# Patient Record
Sex: FEMALE | Race: WHITE | NOT HISPANIC OR LATINO | Employment: OTHER | URBAN - METROPOLITAN AREA
[De-identification: names, ages, dates, MRNs, and addresses within clinical notes are randomized per-mention and may not be internally consistent; named-entity substitution may affect disease eponyms.]

---

## 2019-05-20 ENCOUNTER — TRANSFERRED RECORDS (OUTPATIENT)
Dept: HEALTH INFORMATION MANAGEMENT | Facility: CLINIC | Age: 69
End: 2019-05-20

## 2019-05-24 ENCOUNTER — HOSPITAL ENCOUNTER (INPATIENT)
Facility: CLINIC | Age: 69
End: 2019-05-24
Payer: COMMERCIAL

## 2019-05-30 ENCOUNTER — OFFICE VISIT - HEALTHEAST (OUTPATIENT)
Dept: GERIATRICS | Facility: CLINIC | Age: 69
End: 2019-05-30

## 2019-05-30 DIAGNOSIS — K21.9 GASTROESOPHAGEAL REFLUX DISEASE WITHOUT ESOPHAGITIS: ICD-10-CM

## 2019-05-30 DIAGNOSIS — I48.91 ATRIAL FIBRILLATION WITH RAPID VENTRICULAR RESPONSE (H): ICD-10-CM

## 2019-05-30 DIAGNOSIS — I63.9 CEREBROVASCULAR ACCIDENT (CVA), UNSPECIFIED MECHANISM (H): ICD-10-CM

## 2019-05-30 DIAGNOSIS — G40.209 PARTIAL SYMPTOMATIC EPILEPSY WITH COMPLEX PARTIAL SEIZURES, NOT INTRACTABLE, WITHOUT STATUS EPILEPTICUS (H): ICD-10-CM

## 2019-05-30 DIAGNOSIS — I10 ACCELERATED HYPERTENSION: ICD-10-CM

## 2019-05-30 DIAGNOSIS — F43.21 ADJUSTMENT DISORDER WITH DEPRESSED MOOD: ICD-10-CM

## 2019-05-31 ENCOUNTER — OFFICE VISIT - HEALTHEAST (OUTPATIENT)
Dept: GERIATRICS | Facility: CLINIC | Age: 69
End: 2019-05-31

## 2019-05-31 DIAGNOSIS — K21.9 GASTROESOPHAGEAL REFLUX DISEASE WITHOUT ESOPHAGITIS: ICD-10-CM

## 2019-05-31 DIAGNOSIS — L29.9 PRURITUS: ICD-10-CM

## 2019-05-31 DIAGNOSIS — I10 ACCELERATED HYPERTENSION: ICD-10-CM

## 2019-05-31 DIAGNOSIS — G40.209 PARTIAL SYMPTOMATIC EPILEPSY WITH COMPLEX PARTIAL SEIZURES, NOT INTRACTABLE, WITHOUT STATUS EPILEPTICUS (H): ICD-10-CM

## 2019-05-31 DIAGNOSIS — I10 BENIGN ESSENTIAL HYPERTENSION: ICD-10-CM

## 2019-05-31 DIAGNOSIS — E78.5 DYSLIPIDEMIA: ICD-10-CM

## 2019-05-31 DIAGNOSIS — I48.91 ATRIAL FIBRILLATION WITH RAPID VENTRICULAR RESPONSE (H): ICD-10-CM

## 2019-05-31 DIAGNOSIS — R42 VERTIGO: ICD-10-CM

## 2019-05-31 DIAGNOSIS — I63.9 CEREBROVASCULAR ACCIDENT (CVA), UNSPECIFIED MECHANISM (H): ICD-10-CM

## 2019-06-03 ENCOUNTER — COMMUNICATION - HEALTHEAST (OUTPATIENT)
Dept: GERIATRICS | Facility: CLINIC | Age: 69
End: 2019-06-03

## 2019-06-03 ENCOUNTER — RECORDS - HEALTHEAST (OUTPATIENT)
Dept: LAB | Facility: CLINIC | Age: 69
End: 2019-06-03

## 2019-06-03 LAB
ANION GAP SERPL CALCULATED.3IONS-SCNC: 9 MMOL/L (ref 5–18)
BASOPHILS # BLD AUTO: 0 THOU/UL (ref 0–0.2)
BASOPHILS NFR BLD AUTO: 0 % (ref 0–2)
BUN SERPL-MCNC: 16 MG/DL (ref 8–22)
CALCIUM SERPL-MCNC: 10 MG/DL (ref 8.5–10.5)
CHLORIDE BLD-SCNC: 111 MMOL/L (ref 98–107)
CO2 SERPL-SCNC: 23 MMOL/L (ref 22–31)
CREAT SERPL-MCNC: 0.68 MG/DL (ref 0.6–1.1)
EOSINOPHIL # BLD AUTO: 0.4 THOU/UL (ref 0–0.4)
EOSINOPHIL NFR BLD AUTO: 5 % (ref 0–6)
ERYTHROCYTE [DISTWIDTH] IN BLOOD BY AUTOMATED COUNT: 11.9 % (ref 11–14.5)
GFR SERPL CREATININE-BSD FRML MDRD: >60 ML/MIN/1.73M2
GLUCOSE BLD-MCNC: 94 MG/DL (ref 70–125)
HCT VFR BLD AUTO: 32.7 % (ref 35–47)
HGB BLD-MCNC: 10.8 G/DL (ref 12–16)
LEVETIRACETAM (KEPPRA): 25.5 UG/ML (ref 6–46)
LYMPHOCYTES # BLD AUTO: 2 THOU/UL (ref 0.8–4.4)
LYMPHOCYTES NFR BLD AUTO: 20 % (ref 20–40)
MCH RBC QN AUTO: 31.2 PG (ref 27–34)
MCHC RBC AUTO-ENTMCNC: 33 G/DL (ref 32–36)
MCV RBC AUTO: 95 FL (ref 80–100)
MONOCYTES # BLD AUTO: 0.8 THOU/UL (ref 0–0.9)
MONOCYTES NFR BLD AUTO: 8 % (ref 2–10)
NEUTROPHILS # BLD AUTO: 6.4 THOU/UL (ref 2–7.7)
NEUTROPHILS NFR BLD AUTO: 67 % (ref 50–70)
PLATELET # BLD AUTO: 455 THOU/UL (ref 140–440)
PMV BLD AUTO: 10.6 FL (ref 8.5–12.5)
POTASSIUM BLD-SCNC: 3.6 MMOL/L (ref 3.5–5)
RBC # BLD AUTO: 3.46 MILL/UL (ref 3.8–5.4)
SODIUM SERPL-SCNC: 143 MMOL/L (ref 136–145)
WBC: 9.7 THOU/UL (ref 4–11)

## 2019-06-04 ENCOUNTER — COMMUNICATION - HEALTHEAST (OUTPATIENT)
Dept: ADMINISTRATIVE | Facility: CLINIC | Age: 69
End: 2019-06-04

## 2019-06-04 ENCOUNTER — OFFICE VISIT - HEALTHEAST (OUTPATIENT)
Dept: GERIATRICS | Facility: CLINIC | Age: 69
End: 2019-06-04

## 2019-06-04 DIAGNOSIS — I63.9 CEREBROVASCULAR ACCIDENT (CVA), UNSPECIFIED MECHANISM (H): ICD-10-CM

## 2019-06-04 DIAGNOSIS — E78.5 DYSLIPIDEMIA: ICD-10-CM

## 2019-06-04 DIAGNOSIS — I10 BENIGN ESSENTIAL HYPERTENSION: ICD-10-CM

## 2019-06-04 DIAGNOSIS — F43.21 ADJUSTMENT DISORDER WITH DEPRESSED MOOD: ICD-10-CM

## 2019-06-04 DIAGNOSIS — K21.9 GASTROESOPHAGEAL REFLUX DISEASE WITHOUT ESOPHAGITIS: ICD-10-CM

## 2019-06-04 DIAGNOSIS — I10 ACCELERATED HYPERTENSION: ICD-10-CM

## 2019-06-04 DIAGNOSIS — R42 VERTIGO: ICD-10-CM

## 2019-06-04 DIAGNOSIS — G40.209 PARTIAL SYMPTOMATIC EPILEPSY WITH COMPLEX PARTIAL SEIZURES, NOT INTRACTABLE, WITHOUT STATUS EPILEPTICUS (H): ICD-10-CM

## 2019-06-06 ENCOUNTER — AMBULATORY - HEALTHEAST (OUTPATIENT)
Dept: GERIATRICS | Facility: CLINIC | Age: 69
End: 2019-06-06

## 2019-06-08 PROBLEM — E83.19 MULTIPLE HEMOSIDERIN DEPOSITS IN BRAIN: Status: ACTIVE | Noted: 2019-06-08

## 2019-06-08 PROBLEM — G93.89 MULTIPLE HEMOSIDERIN DEPOSITS IN BRAIN: Status: ACTIVE | Noted: 2019-06-08

## 2019-06-08 PROBLEM — Z86.73 HISTORY OF CVA (CEREBROVASCULAR ACCIDENT): Status: ACTIVE | Noted: 2019-06-08

## 2019-06-08 PROBLEM — G40.209 NONINTRACTABLE EPILEPSY WITH COMPLEX PARTIAL SEIZURES (H): Status: ACTIVE | Noted: 2019-06-08

## 2019-06-08 PROBLEM — I48.0 PAROXYSMAL ATRIAL FIBRILLATION (H): Status: ACTIVE | Noted: 2019-06-08

## 2019-06-08 PROBLEM — I10 BENIGN ESSENTIAL HYPERTENSION: Status: ACTIVE | Noted: 2019-06-08

## 2019-06-08 PROBLEM — E78.5 HYPERLIPIDEMIA: Status: ACTIVE | Noted: 2019-06-08

## 2019-06-08 PROBLEM — K21.9 GASTROESOPHAGEAL REFLUX DISEASE WITHOUT ESOPHAGITIS: Status: ACTIVE | Noted: 2019-06-08

## 2019-06-10 ENCOUNTER — TELEPHONE (OUTPATIENT)
Dept: FAMILY MEDICINE | Facility: CLINIC | Age: 69
End: 2019-06-10

## 2019-06-10 NOTE — TELEPHONE ENCOUNTER
[facility] phone call message- general phone call:    Reason for call: Requesting verbal orders for SN for 2 x a wk for 1 wk, 1 x a wk for 3 wks and 1 x for discharge. This is for med and bp management/education.    New diagnosis for this patient is A fib and they prescribed Eliquis.  This is not covered and it's $400 a month.  Can you prescribe something else?     What do you want reported to you? What are your BP parameters?  Return call needed: Yes    OK to leave a message on voice mail? Yes    Primary language: English      needed? No    Call taken on Mya 10, 2019 at 11:21 AM by Fransisco Mukherjee

## 2019-06-10 NOTE — TELEPHONE ENCOUNTER
Called and gave V.O. For SN for 2x a week for 1 week, then 1x a week for 3 weeks and 1 x for discharge.    Also let HHN know that we have not seen patient in clinic but will pass on information re: a fib and the eliquis along with parameters and what provider wants reported.    Routed to Dr. Bryant/DEMETRICE Washburn RN

## 2019-06-11 ENCOUNTER — OFFICE VISIT (OUTPATIENT)
Dept: FAMILY MEDICINE | Facility: CLINIC | Age: 69
End: 2019-06-11
Payer: COMMERCIAL

## 2019-06-11 ENCOUNTER — OFFICE VISIT (OUTPATIENT)
Dept: PHARMACY | Facility: CLINIC | Age: 69
End: 2019-06-11
Payer: COMMERCIAL

## 2019-06-11 VITALS
OXYGEN SATURATION: 98 % | SYSTOLIC BLOOD PRESSURE: 171 MMHG | HEART RATE: 60 BPM | RESPIRATION RATE: 16 BRPM | TEMPERATURE: 98.2 F | DIASTOLIC BLOOD PRESSURE: 72 MMHG

## 2019-06-11 DIAGNOSIS — I48.0 PAROXYSMAL ATRIAL FIBRILLATION (H): ICD-10-CM

## 2019-06-11 DIAGNOSIS — Z00.00 HEALTH CARE MAINTENANCE: ICD-10-CM

## 2019-06-11 DIAGNOSIS — I10 BENIGN ESSENTIAL HYPERTENSION: Primary | ICD-10-CM

## 2019-06-11 DIAGNOSIS — G40.209 PARTIAL SYMPTOMATIC EPILEPSY WITH COMPLEX PARTIAL SEIZURES, NOT INTRACTABLE, WITHOUT STATUS EPILEPTICUS (H): ICD-10-CM

## 2019-06-11 DIAGNOSIS — Z86.73 HISTORY OF CVA (CEREBROVASCULAR ACCIDENT): ICD-10-CM

## 2019-06-11 DIAGNOSIS — E78.5 HYPERLIPIDEMIA, UNSPECIFIED HYPERLIPIDEMIA TYPE: ICD-10-CM

## 2019-06-11 DIAGNOSIS — I48.0 PAROXYSMAL ATRIAL FIBRILLATION (H): Primary | ICD-10-CM

## 2019-06-11 RX ORDER — LISINOPRIL 40 MG/1
40 TABLET ORAL DAILY
COMMUNITY
Start: 2019-06-11 | End: 2019-06-27

## 2019-06-11 RX ORDER — CALCIUM CARBONATE 500 MG/1
1 TABLET, CHEWABLE ORAL 2 TIMES DAILY PRN
COMMUNITY
Start: 2019-06-11 | End: 2022-08-22

## 2019-06-11 RX ORDER — ATORVASTATIN CALCIUM 40 MG/1
40 TABLET, FILM COATED ORAL DAILY
COMMUNITY
Start: 2019-06-11 | End: 2019-06-27

## 2019-06-11 RX ORDER — LEVETIRACETAM 750 MG/1
750 TABLET ORAL 2 TIMES DAILY
COMMUNITY
Start: 2019-06-11 | End: 2019-06-27

## 2019-06-11 RX ORDER — FAMOTIDINE 20 MG/1
20 TABLET, FILM COATED ORAL 2 TIMES DAILY
COMMUNITY
Start: 2019-06-11 | End: 2019-06-27

## 2019-06-11 RX ORDER — METOPROLOL SUCCINATE 200 MG/1
200 TABLET, EXTENDED RELEASE ORAL DAILY
COMMUNITY
Start: 2019-06-11 | End: 2019-06-27

## 2019-06-11 RX ORDER — ACETAMINOPHEN 325 MG/1
325-650 TABLET ORAL EVERY 6 HOURS PRN
COMMUNITY
Start: 2019-06-11 | End: 2021-08-16

## 2019-06-11 NOTE — PROGRESS NOTES
Preceptor Attestation:   Patient seen, evaluated and discussed with the resident. I have verified the content of the note, which accurately reflects my assessment of the patient and the plan of care.   Supervising Physician:  Angel Cagle MD

## 2019-06-11 NOTE — PATIENT INSTRUCTIONS
Thanks for coming in today Alan! It was good to see you out of the hospital.      a blood pressure cuff and take your blood pressure once a day (different times of the day) for the next 2 weeks. Keep a log and brink this with to clinic. Then we can decide if we need to tweak your BP medications.    I sent in a referral for Optometry to have your eyes checked.    We changed your anticoagulant from Eliquis to Xarelto. This is now a once a day medication.     I look forward to seeing you again on 6/25. Remember to bring your BP log with you.    We'll be prepared in 2 weeks in case we are still running into issues with insurance.      OPHTHALMOLOGY ADULT REFERRAL    [facility]    [address] [place] [address]      Appointment:  Tuesday July 16, 2019  Arrival Time:  1:35 pm  Provider:  Dr. Rancho Nguyen     Please bring a copy of your insurance card and photo ID    Your appointment will be between 90 minutes to 2 hours long    Your eyes will be dilated     If you wear contacts or glasses please bring these with you to your appointment     If you cannot make this appointment, please contact [phone] to reschedule

## 2019-06-11 NOTE — PROGRESS NOTES
Hospitalization Follow-up Visit         \[facility]\""       Hospital Follow-up Visit:    Hospital:  [facility]   Date of Admission: 5/20/99  Date of Discharge: 5/29/19  Reason(s) for Admission: Stroke, atrial fibrillation with RVR, hypertensive urgency            Problems taking medications regularly:  None       Post Discharge Medication Reconciliation: discharge medications reconciled and changed, per note/orders (see AVS). Eliquis not covered, changed to Xarelto.       Problems adhering to non-medication therapy:  None       Medications reviewed by: by PharmD    Summary of hospitalization:  [facility] discharge summary reviewed  Diagnostic Tests/Treatments reviewed.  Follow up needed: none  Other Healthcare Providers Involved in Patient s Care:         Homecare and Specialist appointment - Neurology and ENT  Update since discharge: improved. Has been asymptomatic and stable. Doing well with home OT, PT and nursing care. No recurrence of dizziness  Plan of care communicated with patient and her daughter                   Review of Systems:   CONSTITUTIONAL: no fatigue, no unexpected change in weight  SKIN: no worrisome rashes, no worrisome moles, no worrisome lesions  EYES: no acute vision problems or changes  ENT: no ear problems, no mouth problems, no throat problems  RESP: no significant cough, no shortness of breath  CV: no chest pain, no palpitations, no new or worsening peripheral edema  GI: no nausea, no vomiting, no constipation, no diarrhea  EXT: Itchy feet            Physical Exam:     Vitals:    06/11/19 1352 06/11/19 1355 06/11/19 1451   BP: 174/81 179/78 171/72   BP Location:   Left arm   Patient Position:   Sitting   Cuff Size:   Adult Large   Pulse: 60     Resp: 16     Temp: 98.2  F (36.8  C)     TempSrc: Oral     SpO2: 98%       There is no height or weight on file to calculate BMI.    GENERAL: healthy, alert and no distress  FACE: Well healing echymosis and nasal bridge laceration  EYES: Eyes  "grossly normal to inspection, extraocular movements - intact, and PERRL  NECK: no tenderness, no adenopathy, no asymmetry, no masses, no stiffness; thyroid- normal to palpation  RESP: lungs clear to auscultation - no rales, no rhonchi, no wheezes  CV: regular rates and rhythm, normal S1 S2, no S3 or S4 and no murmur, no click or rub -  ABDOMEN: soft, no tenderness, no  hepatosplenomegaly, no masses, normal bowel sounds  MS: extremities- no gross deformities noted, no edema  SKIN: no suspicious lesions, no rashes  NEURO: strength and tone- normal, sensory exam- grossly normal, mentation- intact, speech- normal, reflexes- symmetric  BACK: no CVA tenderness, no paralumbar tenderness  PSYCH: Alert and oriented times 3; speech- coherent , normal rate and volume; able to articulate logical thoughts, able to abstract reason, no tangential thoughts, no hallucinations or delusions, affect- normal  LYMPHATICS: ant. cervical- normal, post. cervical- normal         Results:   Results from last visit No results found for this or any previous visit.    Assessment and Plan      Galina was seen today for recheck.    Diagnoses and all orders for this visit:    Benign essential hypertension  Patient was 200s/100s systolic on presentation to hospital, eventually able to get down to the 140s-150s systolic with lisinopril 40 mg and metroprolol succ 200 mg daily, she remained in this range during her TCU stay. Today 170s/70-80s even on repeat and apparently this high when checked by home nurse. She attributes this to \"white coat hypertension\", prior to hospital stay had not been seen by a physician for 20+ years. She is asymptomatic. Prior to changing adding another BP med, I would like to see non-medical personnel related BPs especially when BPs had been better controlled on this regimen. DME rx sent for home BP cuff, she will keep a log and bring to her appt with me in 2 weeks. I think the next step if she continues to be high would be " lisinopril-hydrochlorothiazide combination medication to reduce pill burden (prior to hospitalization, had not been on any medications).   -     order for DME; Equipment being ordered: Digital home blood pressure monitor kit  -     Continue lisinopril 40 mg daily  -     Continue metoprolol succinate 200 mg daily    Health care maintenance  Thinks her vision is getting worse and may need glasses  -     OPTOMETRY REFERRAL    Partial symptomatic epilepsy with complex partial seizures, not intractable, without status epilepticus (H)  No more dizziness or absence-like activity. She has a follow up scheduled with neurology in         -     Continue Keppra 750 mg daily    Hyperlipidemia, unspecified hyperlipidemia type        -      Continue Atorvastatin 40 mg    Paroxysmal atrial fibrillation (H)  Patient presented to hospital in A fib, converted with dilt and rates well controlled with metoprolol. She is in a regular rhythm today. She was rx'd eliquis BID in the hospital, but on discharge from TCU found out this was not covered by her insurance and would cost ~$400/month. She only has one day left of the eliquis. Clinic pharmacist met with patient as well to discuss options and possibility of patient qualifying for medicaid, which would back pay medication costs up to 3 months. Clinic social work also met with patient to discuss medicaid. Xarelto was determined to be close to ~$200/month with added benefit of once daily dosing. Warfarin maintenance was more than patient is able to do, plus would have to bridge with lovenox, which is expensive. Patient and daughter decided they will pay for one month's worth of Xarelto while they apply for medicaid. We can follow up on this at our appointment in 2 weeks.          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 No new focal neurological findings. As above, neuro follow up schedule. Has home nursing and home PT/OT.        -      Continue Atorvastatin 40 mg        -       Anticoagulation, as above      E&M code to be billed if TCM cannot be: 01426    Type of decision making: Moderate complexity (44247)      Options for treatment and follow-up care were reviewed with the patient  Galina Barnes   engaged in the decision making process and verbalized understanding of the options discussed and agreed with the final plan.    Patient discussed with attending physician Dr. Cagle who agrees with the plan.     Jose Bryant MD

## 2019-06-11 NOTE — PROGRESS NOTES
KLAUDIA met with pt and daughter per Dr. Bryant's request. Pt expresses concern due to recent stroke. Currently taking Xarelto, however she is unable to afford monthly prescriptions (~$400). Pt and daughter are interested in her receiving coverage under [place] [employer]. Pt told billable costs may be covered three months before receiving coverage. Pt appears to qualify for coverage with an income of ~$1,000/mo. Pt had received previous input to contact [employer] regarding prescription discounts based on income.    Pt and daughter received resources regarding application to MA. KLAUDIA provided education regarding application process, seeking assistance via phone, and consistently contacting [facility] to expedite the process.    Pt is interested in passing power of  over to her daughter. No resources were distributed regarding POA at this time. Pt and daughter instructed to contact KLAUDIA if any other concerns arise.    LEAH Vasquez  6/11/2019

## 2019-06-12 ENCOUNTER — TELEPHONE (OUTPATIENT)
Dept: FAMILY MEDICINE | Facility: CLINIC | Age: 69
End: 2019-06-12

## 2019-06-12 ENCOUNTER — OFFICE VISIT (OUTPATIENT)
Dept: FAMILY MEDICINE | Facility: CLINIC | Age: 69
End: 2019-06-12
Payer: COMMERCIAL

## 2019-06-12 VITALS
RESPIRATION RATE: 20 BRPM | TEMPERATURE: 98 F | DIASTOLIC BLOOD PRESSURE: 78 MMHG | SYSTOLIC BLOOD PRESSURE: 181 MMHG | OXYGEN SATURATION: 97 % | HEART RATE: 54 BPM

## 2019-06-12 DIAGNOSIS — G44.209 TENSION HEADACHE: ICD-10-CM

## 2019-06-12 DIAGNOSIS — I10 BENIGN ESSENTIAL HYPERTENSION: Primary | ICD-10-CM

## 2019-06-12 RX ORDER — AMLODIPINE BESYLATE 10 MG/1
10 TABLET ORAL DAILY
Qty: 90 TABLET | Refills: 1 | Status: SHIPPED | OUTPATIENT
Start: 2019-06-12 | End: 2019-12-01

## 2019-06-12 NOTE — TELEPHONE ENCOUNTER
Pt is seeing Dr. Alexander this afternoon. Dr. Bryant said the provider seeing pt today can call him.    Routed to Dr. Alexander. /DONALDO Mackay

## 2019-06-12 NOTE — PROGRESS NOTES
Preceptor attestation:  Vital signs reviewed: /78   Pulse 54   Temp 98  F (36.7  C) (Oral)   Resp 20   SpO2 97%     Patient seen, evaluated, and discussed with the resident.  I have verified the content of the note, which accurately reflects my assessment of the patient and the plan of care.    Supervising physician: Sandhya Newman MD  [facility]

## 2019-06-12 NOTE — PROGRESS NOTES
Transitional Care / Medication Management Note                                                       Galina was referred by Dr. Bryant for pharmacy services for TCM/new patient    MEDICATION REVIEW:  Discussed all medication indications, dosage and effectiveness, adverse effects, and adherence with patient/caregiver.    Pt had meds with them: yes  Pt had med list with them: yes  Pt was knowledgeable about meds: yes  Medications set up by: patient  Medications administered by someone else (e.g., LTCF): No  Pt uses a medication box or automated dispenser: no  Patient has been seen by PharmD in past 6 months:  no    Medication Discrepancies  Medications on EMR med list that pt is NOT taking:  none  Medications (including OTCs/supplements) pt IS taking that are NOT on EMR med list (e.g., from specialist, hospital): yes, Atorvastatin, Famotidine, Lisinopril, Metoprolol XL, Levetiracetam, APAP, Tums, Eliquis, hydrocortisone cream  Dosage or frequency listed differently than how patient is taking: none  Duplicate medication on list (two occurrences of the same medication):  none  TOTAL NUMBER OF MEDICATION DISCREPANCIES: 9     The following medications were added in the hospital:    Atorvastatin, Famotidine, Lisinopril, Metoprolol XL, Levetiracetam, APAP, Tums, Eliquis, hydrocortisone cream  The following medications were discontinued in the hospital:    none  The following medications had dose/frequency changes in the hospital:    none  The following medication changes made in the hospital were not implemented by the patient:    none    Subjective                                                       Patient reports the following problems or concerns with their medications:  yes, Eliquis (see below)  Patient reports the following adverse reactions to medications:  yes, feet itch since starting medications  Pt reports missing doses:  never  Additional subjective information (e.g., reason for visit, frequency of PRNs, reasons  meds were D/C ed):    Prior to hospital admission on 5/20/19, patient had not seen a medical provider in 27 years    Admitted for hypertensive urgency 5/20/19-5/29/19    Several diagnoses made during hospital admission    Patients daughter, who works full time, very involved with her care and at visit today    Daughter made patient a chart of patients medications, noting which medications are taken in the morning and evening    Patient finds the chart very helpful to know what medications she takes at different times of day as the complex medication regimen is new to her    Patient reported concerns     Eliquis co-pay ~$400 and not affordable    Feet have been itching/hurting since hospital admission and starting medications, asked if itching could be caused by any of medications she is on. Itching got worse after hospital discharge, during her stay at SNF, but has gotten better since she has been home.    Reports using APAP 650 mg TID for feet, which helps. She currently using APAP 325 mg tabs at home, but new bottle is APAP 500 mg tabs.    Reports currently using Hydrocortisone Cream (strength unknown) 1-2 x per day, everyday, which helps. At SNF used cream 3-4 x per day.    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     Gastroesophageal reflux disease without esophagitis     Hyperlipidemia       Current Outpatient Medications   Medication Sig Dispense Refill     acetaminophen (TYLENOL) 325 MG tablet Take 1-2 tablets (325-650 mg) by mouth every 6 hours as needed for mild pain       atorvastatin (LIPITOR) 40 MG tablet Take 1 tablet (40 mg) by mouth daily       calcium carbonate  (TUMS) 500 MG chewable tablet Take 1 tablet (500 mg) by mouth 2 times daily as needed for heartburn       famotidine (PEPCID) 20 MG tablet Take 1 tablet (20 mg) by mouth 2 times daily       levETIRAcetam (KEPPRA) 750 MG tablet Take 1 tablet (750 mg) by mouth 2 times daily       lisinopril (PRINIVIL/ZESTRIL) 40 MG tablet Take 1 tablet (40 mg) by mouth daily       metoprolol succinate ER (TOPROL-XL) 200 MG 24 hr tablet Take 1 tablet (200 mg) by mouth daily       rivaroxaban ANTICOAGULANT (XARELTO) 20 MG TABS tablet Take 1 tablet (20 mg) by mouth daily (with dinner) 90 tablet 3     order for DME Equipment being ordered: Digital home blood pressure monitor kit 1 each 0       Social History     Tobacco Use     Smoking status: Never Smoker     Smokeless tobacco: Never Used   Substance Use Topics     Alcohol use: Not Currently     Drug use: Never       CrCl cannot be calculated (No order found.).    No results found for: A1C  Last Comprehensive Metabolic Panel:  No results found for: NA, POTASSIUM, CHLORIDE, CO2, ANIONGAP, GLC, BUN, CR, GFRESTIMATED, RIGOBERTO    BP Readings from Last 3 Encounters:   06/11/19 171/72       The ASCVD Risk score (Zanoni ALEJA Jr., et al., 2013) failed to calculate for the following reasons:    The patient has a prior MI or stroke diagnosis    PHQ-9 score:  No flowsheet data found.    Assessment / Plan                                                       Updated medication list in the EMR; deleted meds patient no longer taking and added meds patient is now taking, and changed doses where there was a dose discrepancy.    All medications were reviewed and found to be indicated, effective, safe and convenient/ affordable unless drug therapy problem(s) was/were identified, as are described below.      A fib - uncontrolled     Eliquis unaffordable    Anticoagulation prescribed for A fib/CVA. Patient following up with neurology and cardiology as recommended.      Patient and patients daughter not interested  in Warfarin due to amount of monitoring involved and daughter not able to bring her in on a frequent basis due to working full time    Eliquis switched to Xarelto today    After checking with patients pharmacy, Xarelto co-pay ~$200/month.  Rahul involved with helping patient figure out insurance plan options so she has affordable co-pays. Rahul believes she will qualify for Medicaid and they will then back-pay for the cost of the medications.    Hypertension - uncontrolled     Today 168/80    Given patient previously controlled on medications (Lisinopril 40 mg daily and Metoprolol  mg daily) and given recent circumstances, patient will monitor blood pressure at home per Dr Bryant (home monitor prescribed today) and reassess at next visit    Feet itching/pain - uncontrolled     Using Hydrocortisone cream 1-2 times per day, everyday, APAP 650mg TID    Given condition improving, will reassess use at next visit    Informed patient this is unlikely to be due to medication side effects    Options for treatment and/or follow-up care were reviewed with the patient.  Galina was engaged and actively involved in the decision making process, verbalized understanding of the options discussed, and was satisfied with the final plan.    Patient was provided with written instructions/medication list via AVS.     Follow-up                                                       Medication issues be addressed at a future visit      Xarelto- co-pay/insurance; approved for [place]?    Assess home blood pressures    Dr. Bryant was provided the recommendations above  via routed note and Dr. Hicks was available for supervision during this visit and is the authorizing prescriber for this visit through the pharmacist collaborative practice agreement.    Vnaessa Odell, PharmD Student    Drug therapy problems identified  1. Med: Eliquis - Compliance - Patient cannot afford - Resolution: Change drug; Patient agreed-CPA    # of medical  conditions addressed: 2  # of medications addressed: 9  # of medication discrepancies identified: 9  # of DTP identified: 1  Time spent: 30 minutes  Level of service: 2nc    I was present with the pharmacy student who participated in the service and in the documentation of this note. I have verified the history, personally performed the medical decision making, and have verified the content of the note, which accurately reflects my assessment of the patient and the plan of care.   Shania Fournier, PharmD

## 2019-06-12 NOTE — TELEPHONE ENCOUNTER
[facility] phone call message- general phone call:    Reason for call: Requesting a verbal order for a SW eval.  Pt's BP was 180/110 today. Pt is taking medications as prescribed.    Return call needed: Yes    OK to leave a message on voice mail? Yes    Primary language: English      needed? No    Call taken on June 12, 2019 at 11:21 AM by Fransisco Mukherjee

## 2019-06-12 NOTE — PROGRESS NOTES
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     Patient's home health nurse called earlier today concerned about elevated blood pressure to 180/110, on recheck 179/110. Patient denied any acute symptoms including dizziness, weakness, shortness of breath, chest pain, nausea, vomiting or urinary symptoms but did complain of chronic headaches. She reports that this feels like her usual headaches that are related to stress. Patient was advised to come to clinic today for evaluation. Of note, patient was seen in clinic yesterday for HTN. She was prescribed a home BP monitor kit, and encouraged to continue Lisinopril 40mg and Metoprolol succinate 200mg. In clinic, she continues to deny any significant symptoms. Her headache improved with some Tylenol earlier. She's been working eating less sodium in her diet and wondering what else she should do. Reports adherence to all meds since hospital discharge.     Objective:  Vitals:    06/12/19 1534   BP: 181/78   Pulse: 54   Resp: 20   Temp: 98  F (36.7  C)   TempSrc: Oral   SpO2: 97%     GEN: NAD, pleasant, alert  EYES: healing bruises around eyes and cheekbones without gross deformity, EOMI, normal conjunctivae/sclerae  RESP: CTAB, no w/r/r  CV: slow rate, regular rhythm, nl S1/S2, no m/r/g, no peripheral edema  MSK: steady gait with walking cane  NEURO: normal/equal strength and tone of upper and lower extremities, sensory exam grossly normal, mentation intact, speech normal  PSYCH: mentation appears normal, affect  normal/bright    Assessment/Plan:  Galina was seen today for hypertension.    Diagnoses and all orders for this visit:    Benign essential hypertension, above goal  Patient remains largely asymptomatic from elevated BP. Her chronic headaches appear unaffected by her BP and resolve with Tylenol PRN. There was concern for an element of whitecoat/office-based HTN, but given her elevated BP readings at home we discussed adding another antihypertensive. She's agreeable to start Amlodipine today, discussed common side effects. Encouraged lower sodium diet, exercise, log home BP, and bring logbook to clinic in 2 weeks for BP recheck.   -     amLODIPine (NORVASC) 10 MG tablet; Take 1 tablet (10 mg) by mouth daily    Options for treatment and follow-up care were reviewed with the patient who was engaged and actively involved in the decision making process, verbalized understanding of the options discussed, and satisfied with the final plan.    Patient was staffed with supervising physician, Dr. Newman.     Naun Alexander MD, PGY-2  [facility]

## 2019-06-12 NOTE — TELEPHONE ENCOUNTER
180/110, 179/110 (second check)  HR 60s. Pt was asymptomatic. Pt is taking lisinopril and metoprolol as directed.    HHN is going back Friday.     Discussed the above with Dr. Bryant, patient should be seen in clinic if she is unable to check her bp at home herself.     I talked to the pt, pt states she feels fine, denies dizziness, HA, weakness, SOB, chest pain, n/v. Advised to be seen in clinic today per Dr. Bryant's recommendation. Pt states she needs to call her daughter to give her a ride and she will call back to schedule an appt in clinic.     Routed to Dr. Bryant.    /DONALDO Mackay

## 2019-06-12 NOTE — PATIENT INSTRUCTIONS
- Start Amlodipine, 1 tab daily  - Return in 2 weeks for blood pressure recheck or sooner if concerns  - Work on low sodium diet, exercise  - Follow up with Neurology  - Follow up with ENT  - Follow up with Cardiology

## 2019-06-13 ENCOUNTER — TELEPHONE (OUTPATIENT)
Dept: FAMILY MEDICINE | Facility: CLINIC | Age: 69
End: 2019-06-13

## 2019-06-13 NOTE — TELEPHONE ENCOUNTER
[facility] phone call message- general phone call:    Reason for call:     GOYO - OCCUPATIONAL THERAPIST  [phone]  [facility]    Requesting verbal orders for:    Therapeutic activities   ADLs  IADLs  Cognitive assessments  Fall precautions    1x a week for 4 weeks,     Secure voicemail. Please leave message if needed.     Return call needed: Yes    OK to leave a message on voice mail? Yes    Primary language: English      needed? No    Call taken on June 13, 2019 at 8:59 AM by Skylar Torres

## 2019-06-17 ENCOUNTER — TELEPHONE (OUTPATIENT)
Dept: FAMILY MEDICINE | Facility: CLINIC | Age: 69
End: 2019-06-17

## 2019-06-17 NOTE — TELEPHONE ENCOUNTER
[facility] phone call message- general phone call:    Reason for call: She is in the home with the patient and would like to talk to a nurse.    Return call needed: Yes    OK to leave a message on voice mail? Yes    Primary language: English      needed? No    Call taken on June 17, 2019 at 11:40 AM by Paul Tabares

## 2019-06-17 NOTE — TELEPHONE ENCOUNTER
Spoke with HHN who wanted to let provider know that patient's BP is 152/80.  HHN states that this is better than prior readings.   She is also going to be ordering the patient a wrist BP cuff so that she can continue to check her BP.    Gave V.O. For 1 more home visit this Thursday for BP recheck and medication education.    Routed to Dr. Bryant/DEMETRICE Washburn RN

## 2019-06-18 ENCOUNTER — TELEPHONE (OUTPATIENT)
Dept: PHARMACY | Facility: CLINIC | Age: 69
End: 2019-06-18

## 2019-06-18 NOTE — TELEPHONE ENCOUNTER
Patient's daughter dropped off a completed patient assistance program application for Xarelto.  I called her ([place]: [phone]).  She said her mom does not qualify for MA, so she is applying for as much help with Xarelto as possible.    As Dr. Bryant is not licensed, Dr. Porras signed the patient assistance form and I faxed it to [employer] today.    Shania Fournier, Pharm.D.

## 2019-06-24 ENCOUNTER — TELEPHONE (OUTPATIENT)
Dept: FAMILY MEDICINE | Facility: CLINIC | Age: 69
End: 2019-06-24

## 2019-06-24 NOTE — TELEPHONE ENCOUNTER
Gave V.O. For 2 nursing visits to begin on 7/8/19 with plans to discharge on 7/15/19 for bp monitoring and med education.    Noted VSS-  Routed to Dr. Bryant/DEMETRICE Washburn RN

## 2019-06-24 NOTE — TELEPHONE ENCOUNTER
[facility] phone call message- general phone call:    Reason for call: vital for today pulse 56 bp 140/82 tem 98.2 respiration 16. Orders two nursing visits beginning 7/8 with plans to discharge of 7/15 for bp monitoring and med edu.    Action desired:call back.        Return call needed: Yes    OK to leave a message on voice mail? Yes    Advised patient to response may take up to 2 business days: Yes    Primary language: English      needed? No    Call taken on June 24, 2019 at 10:28 AM by Paul Tabares

## 2019-06-25 ENCOUNTER — RECORDS - HEALTHEAST (OUTPATIENT)
Dept: ADMINISTRATIVE | Facility: OTHER | Age: 69
End: 2019-06-25

## 2019-06-25 ENCOUNTER — OFFICE VISIT (OUTPATIENT)
Dept: FAMILY MEDICINE | Facility: CLINIC | Age: 69
End: 2019-06-25
Payer: COMMERCIAL

## 2019-06-25 VITALS
DIASTOLIC BLOOD PRESSURE: 76 MMHG | WEIGHT: 139 LBS | OXYGEN SATURATION: 98 % | TEMPERATURE: 98.1 F | HEART RATE: 60 BPM | SYSTOLIC BLOOD PRESSURE: 137 MMHG | RESPIRATION RATE: 18 BRPM

## 2019-06-25 DIAGNOSIS — I10 BENIGN ESSENTIAL HYPERTENSION: ICD-10-CM

## 2019-06-25 DIAGNOSIS — Z00.00 ENCOUNTER FOR HEALTH MAINTENANCE EXAMINATION: Primary | ICD-10-CM

## 2019-06-25 DIAGNOSIS — M25.561 CHRONIC PAIN OF RIGHT KNEE: ICD-10-CM

## 2019-06-25 DIAGNOSIS — I48.0 PAROXYSMAL ATRIAL FIBRILLATION (H): ICD-10-CM

## 2019-06-25 DIAGNOSIS — G89.29 CHRONIC PAIN OF RIGHT KNEE: ICD-10-CM

## 2019-06-25 DIAGNOSIS — G40.209 PARTIAL SYMPTOMATIC EPILEPSY WITH COMPLEX PARTIAL SEIZURES, NOT INTRACTABLE, WITHOUT STATUS EPILEPTICUS (H): ICD-10-CM

## 2019-06-25 DIAGNOSIS — Z86.73 HISTORY OF CVA (CEREBROVASCULAR ACCIDENT): ICD-10-CM

## 2019-06-25 NOTE — PROGRESS NOTES
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  Same was seen last week for persistent hypertension in the 170-180 systolic range, pressures are taken by her home nurse.  At that time she was started on amlodipine 10 mg daily.  Since this visit she has received a home blood pressure monitor from home nursing and it is been keeping a log of her pressures.  Her log shows that she is largely in the 120- 140 range.  She has had no recurrence of dizziness, has had no recurrence of absence like seizures activity.  She feels like her mobility is improving, she is walking outside with the use of a cane, but in her small apartment she does not use a cane or a walker.  She has been working well with physical therapy, although she does not like her occupational therapist.  She continues to have occasional pain in her right knee from a dog walking injury, dog ran into her leg and \"bent her knee the wrong way\".  She often will wear a brace on her knee while she is walking, and with the brace on she does not experience knee pain.  She also has constant left ankle stiffness without pain, sometimes \"gives out\".  She is not sure she had an injury to this ankle in the past or not.    ROS:   Gen: No fevers, chills, weight loss  HEENT: No headache, vision changes, hearing loss, swallowing problems   CV: No chest pain, palpitations, peripheral edema  Resp: No SOB, cough, wheezing, congestion, coryza  GI: No " constipation, diarrhea, nausea, vomiting, heartburn, change in bowel habits  : No dysuria, hematuria, discharge  MSK: Knee pain, ankle stiffness  Skin: No rash, lesions    Objective:  Vitals:    06/25/19 1424   BP: 137/76   BP Location: Left arm   Patient Position: Sitting   Cuff Size: Adult Regular   Pulse: 60   Resp: 18   Temp: 98.1  F (36.7  C)   TempSrc: Oral   SpO2: 98%   Weight: 63 kg (139 lb)     There is no height or weight on file to calculate BMI.    GEN: NAD, healthy, alert  FACE: Well-healing, faint facial ecchymoses and central forehead contusion  EYES: grossly normal to inspection, PERRL, EOMI, normal conjunctivae/sclerae  HENT: normal ear canals/TM's, nose & mouth w/o ulcers or lesions, clear oropharynx, MMM  NECK: no LAD, asymmetry, masses, scars; thyroid normal to palpation  RESP: Normal work of breathing on room air, CTAB, no w/r/r  CV: Irregularly irregular rhythm, normal rate, no murmur appreciated, no peripheral edema, peripheral pulses strong  ABD: soft, NT/ND, no hepatosplenomegaly, no masses, no rebound, +BS throughout  MSK: Left knee normal. Right knee: No patellar or patellar tendon tenderness, tenderness immediately lateral to patella along joint line, no other joint line tenderness, full ROM.  Right ankle normal.  Left ankle: Significantly limited range of motion in all directions, but not painful.  Overall fullness of left ankle.  SKIN: no suspicious lesions or rashes  NEURO: normal strength and tone, sensory exam grossly normal, mentation intact, speech normal  PSYCH: mentation appears normal, affect normal/bright    No results found for this or any previous visit (from the past 24 hour(s)).    Assessment/Plan:  Galina was seen today for health maintenance and blood pressure check..    Diagnoses and all orders for this visit:    Encounter for health maintenance examination  Same had not been seen for ~20 years prior to admission at [facility] last month and thus is far behind  "on health maintenance screenings.  Most of the lab work including lipids, glucose, BMP, TSH, CBC was completed during her admission.  At this time she agrees to mammography, colonoscopy and DEXA scan as recommended by the [facility].  Declines lab work today for HIV/hepatitis C, agreed that this could be done at future visit.  -     MA SCREENING DIGITAL BILAT; Future  -     GASTROENTEROLOGY ADULT REF PROCEDURE ONLY  -     Dexa hip/pelvis/spine*; Future    Chronic pain of right knee  Chronic pain of right knee from what sounds like hyperextension injury years ago.  Exam consistent with possible osteoarthritis versus meniscal injury.  She and her daughter are requesting orthopedic referral, although [facility] does note that it really does not bother her that much.  -     ORTHOPEDICS ADULT REFERRAL; Future    Benign essential hypertension  Blood pressure under much better control today than the last 2 visits.  She also had good pressure control at home, I am happy that she is now received a home blood pressure monitor and is keeping a log.  She will continue to do this and bring logs to her visits.  She will continue her home metoprolol, lisinopril and amlodipine at this time.    History of CVA (cerebrovascular accident)  Partial symptomatic epilepsy with complex partial seizures, not intractable, without status epilepticus (H)  Stable and without other symptoms.  She has had no dizziness and seems to be recovering well from her CVA.  She has also had no recurrence of seizure activity.  Seizure activity is absence like in nature, only occurred once during inpatient stay, but convincing for seizure given elevated prolactin immediately following the episode.  Is been going well with PT, she is recovering well from a gait standpoint.  She does not like a recreational therapist, thinks that they are \"making her the best cripple she can be\".  -She has follow-up scheduled with neurology  -She has a follow-up scheduled with " otolaryngology    Paroxysmal atrial fibrillation (H)  Today by exam she is in atrial fibrillation, but she is asymptomatic and heart rate is well controlled in the 60s on metoprolol.  She is also currently anticoagulated on Xarelto.  She will follow-up with cardiology within the next 4-6 weeks as scheduled during her hospital stay.    Options for treatment and follow-up care were reviewed with the patient who was engaged and actively involved in the decision making process, verbalized understanding of the options discussed, and satisfied with the final plan.    Alan will follow-up with me again in 1-2 months after she follows up with the various specialists she is established with.  Of course, if something acute comes up meantime I am happy to see her sooner.    Patient was staffed with supervising physician, Dr. Fam Porras.     Jose Bryant MD, PGY-2  [facility]

## 2019-06-25 NOTE — PROGRESS NOTES
Preceptor Attestation:   Patient seen, evaluated and discussed with the resident. I have verified the content of the note, which accurately reflects my assessment of the patient and the plan of care.   Supervising Physician:  Fam Porras MD

## 2019-06-25 NOTE — PATIENT INSTRUCTIONS
Thanks for coming in again today, Alan. Your blood pressure is now under good control. Hooray!    I sent in the following referrals:  Colonoscopy  Mammogram  Dexa scan for osteoporosis  Orthopedics    Follow up Neurology and ENT as scheduled.    I will get the paperwork completed for your medications and we will work on other issues as they arise.    MAMMOGRAM  June 25, 2019 faxed order and demographics to [facility]iology Scheduling at [phone] who will contact patient to assist. Ramu Olguin CMA      DEXA SCAN  June 25, 2019 faxed order and demographics to [facility]iology Scheduling at [phone] who will contact patient to assist. Ramu Olguin CMA      COLONOSCOPY   June 25, 2019 COLONOSCOPY Screening was ordered.     GASTROENTEROLOGY ADULT REF PROCEDURE ONLY  June 26, 2019 Online referral placed with [facility] who will contact patient to schedule.     [facility]  Phone [phone]  Fax: [phone]    [facility]  [address]  [place], [place] [address]    [facility]  [address])  [place], [place] [address]    [facility]  [address] [place] [id number]    [facility]  [address] (use the outside entrance)  [place], [place] [address]    [facility]  [address]  [place], [place] [address]    ORTHOPEDICS ADULT REFERRAL Placed online they will call patient to schedule  [facility]  Phone: [phone]  Fax: [phone]    [facility]  [address]  [place] [address]    [place]  [address] [place] [address]    [facility]  [address] [place] [address]    [facility]  [address] [place] [address]    [facility]  [address] [place] [address]

## 2019-06-27 DIAGNOSIS — E78.5 HYPERLIPIDEMIA, UNSPECIFIED HYPERLIPIDEMIA TYPE: ICD-10-CM

## 2019-06-27 DIAGNOSIS — I10 BENIGN ESSENTIAL HYPERTENSION: Primary | ICD-10-CM

## 2019-06-27 DIAGNOSIS — K21.9 GASTROESOPHAGEAL REFLUX DISEASE WITHOUT ESOPHAGITIS: ICD-10-CM

## 2019-06-27 DIAGNOSIS — I48.0 PAROXYSMAL ATRIAL FIBRILLATION (H): ICD-10-CM

## 2019-06-27 DIAGNOSIS — G40.209 PARTIAL SYMPTOMATIC EPILEPSY WITH COMPLEX PARTIAL SEIZURES, NOT INTRACTABLE, WITHOUT STATUS EPILEPTICUS (H): ICD-10-CM

## 2019-06-27 RX ORDER — METOPROLOL SUCCINATE 200 MG/1
200 TABLET, EXTENDED RELEASE ORAL DAILY
Qty: 90 TABLET | Refills: 3 | Status: SHIPPED | OUTPATIENT
Start: 2019-06-27 | End: 2020-05-17

## 2019-06-27 RX ORDER — LEVETIRACETAM 750 MG/1
750 TABLET ORAL 2 TIMES DAILY
Qty: 120 TABLET | Refills: 3 | Status: SHIPPED | OUTPATIENT
Start: 2019-06-27 | End: 2020-09-08

## 2019-06-27 RX ORDER — FAMOTIDINE 20 MG/1
20 TABLET, FILM COATED ORAL 2 TIMES DAILY
Qty: 120 TABLET | Refills: 3 | Status: SHIPPED | OUTPATIENT
Start: 2019-06-27 | End: 2020-02-14

## 2019-06-27 RX ORDER — ATORVASTATIN CALCIUM 40 MG/1
40 TABLET, FILM COATED ORAL DAILY
Qty: 90 TABLET | Refills: 3 | Status: SHIPPED | OUTPATIENT
Start: 2019-06-27 | End: 2020-05-17

## 2019-06-27 RX ORDER — LISINOPRIL 40 MG/1
40 TABLET ORAL DAILY
Qty: 90 TABLET | Refills: 3 | Status: SHIPPED | OUTPATIENT
Start: 2019-06-27 | End: 2020-05-17

## 2019-07-01 ENCOUNTER — TELEPHONE (OUTPATIENT)
Dept: FAMILY MEDICINE | Facility: CLINIC | Age: 69
End: 2019-07-01

## 2019-07-01 NOTE — TELEPHONE ENCOUNTER
[facility] phone call message- general phone call:    Reason for call: She is asking if     Action desired: .    Return call needed: Yes    OK to leave a message on voice mail? Yes    Advised patient to response may take up to 2 business days: Yes    Primary language: English      needed? No    Call taken on July 1, 2019 at 11:40 AM by Paul Tabares

## 2019-07-08 ENCOUNTER — MEDICAL CORRESPONDENCE (OUTPATIENT)
Dept: HEALTH INFORMATION MANAGEMENT | Facility: CLINIC | Age: 69
End: 2019-07-08

## 2019-07-08 ENCOUNTER — TELEPHONE (OUTPATIENT)
Dept: FAMILY MEDICINE | Facility: CLINIC | Age: 69
End: 2019-07-08

## 2019-07-08 NOTE — TELEPHONE ENCOUNTER
[facility] phone call message- general phone call:    Reason for call: Calling to get verbal orders for early discharge and visit early. Reason is all goals are met.     Return call needed: Yes    OK to leave a message on voice mail? Yes    Primary language: English      needed? No    Call taken on July 8, 2019 at 12:28 PM by Grisel Flores-Cardona

## 2019-07-08 NOTE — TELEPHONE ENCOUNTER
Verbal Order given to JOANNE Mosley to discharge one visit prior to discharge date. Reason: All goals have been met.  Routed to Dr. Manny Plunkett RN BSN

## 2019-07-15 ENCOUNTER — MEDICAL CORRESPONDENCE (OUTPATIENT)
Dept: HEALTH INFORMATION MANAGEMENT | Facility: CLINIC | Age: 69
End: 2019-07-15

## 2019-07-16 ENCOUNTER — TRANSFERRED RECORDS (OUTPATIENT)
Dept: HEALTH INFORMATION MANAGEMENT | Facility: CLINIC | Age: 69
End: 2019-07-16

## 2019-07-17 ENCOUNTER — OFFICE VISIT - HEALTHEAST (OUTPATIENT)
Dept: CARDIOLOGY | Facility: CLINIC | Age: 69
End: 2019-07-17

## 2019-07-17 DIAGNOSIS — I10 ACCELERATED HYPERTENSION: ICD-10-CM

## 2019-07-17 DIAGNOSIS — I48.0 PAROXYSMAL ATRIAL FIBRILLATION (H): ICD-10-CM

## 2019-07-17 ASSESSMENT — MIFFLIN-ST. JEOR: SCORE: 1129.16

## 2019-07-25 ENCOUNTER — MEDICAL CORRESPONDENCE (OUTPATIENT)
Dept: HEALTH INFORMATION MANAGEMENT | Facility: CLINIC | Age: 69
End: 2019-07-25

## 2019-07-25 ENCOUNTER — DOCUMENTATION ONLY (OUTPATIENT)
Dept: FAMILY MEDICINE | Facility: CLINIC | Age: 69
End: 2019-07-25

## 2019-07-25 NOTE — PROGRESS NOTES
To be completed in Nursing note:    Please reference list for forms that require a visit for completion.  Please remind patients that providers are given 3-5 business days to complete and return forms.      Form type: [facility] at [facility] PT summary     Date form received: 19    Date form completed by Physician: 19    How was form returned to patient (mailed, faxed, or at  for patient to ):Faxed back to Josep @ [phone]    Date form mailed/faxed/left at  for patient and sent to HIM for [email or web address]       Once form is left for patient, faxed, or mailed PCS will then close the documentation only encounter.

## 2019-07-26 ENCOUNTER — OFFICE VISIT - HEALTHEAST (OUTPATIENT)
Dept: AUDIOLOGY | Facility: CLINIC | Age: 69
End: 2019-07-26

## 2019-07-26 ENCOUNTER — OFFICE VISIT - HEALTHEAST (OUTPATIENT)
Dept: OTOLARYNGOLOGY | Facility: CLINIC | Age: 69
End: 2019-07-26

## 2019-07-26 DIAGNOSIS — H90.3 SENSORINEURAL HEARING LOSS, BILATERAL: ICD-10-CM

## 2019-07-26 DIAGNOSIS — R42 DIZZINESS AND GIDDINESS: ICD-10-CM

## 2019-07-26 DIAGNOSIS — R42 DIZZINESS: ICD-10-CM

## 2019-08-05 ENCOUNTER — RECORDS - HEALTHEAST (OUTPATIENT)
Dept: LAB | Facility: HOSPITAL | Age: 69
End: 2019-08-05

## 2019-08-05 LAB
ANION GAP SERPL CALCULATED.3IONS-SCNC: 7 MMOL/L (ref 5–18)
CHLORIDE BLD-SCNC: 109 MMOL/L (ref 98–107)
CO2 SERPL-SCNC: 25 MMOL/L (ref 22–31)
FOLATE SERPL-MCNC: 18.7 NG/ML
LEVETIRACETAM (KEPPRA): 61 UG/ML (ref 6–46)
LYME TOTAL ANTIBODY - HISTORICAL: 0.1 INDEX VALUE
POTASSIUM BLD-SCNC: 4 MMOL/L (ref 3.5–5)
SODIUM SERPL-SCNC: 141 MMOL/L (ref 136–145)
T PALLIDUM AB SER QL: NEGATIVE
VIT B12 SERPL-MCNC: 440 PG/ML (ref 213–816)

## 2019-08-07 LAB — METHYLMALONATE SERPL-SCNC: 0.13 UMOL/L (ref 0–0.4)

## 2019-08-09 ENCOUNTER — RECORDS - HEALTHEAST (OUTPATIENT)
Dept: ADMINISTRATIVE | Facility: OTHER | Age: 69
End: 2019-08-09

## 2019-08-10 LAB
ARSENIC, WHOLE BLOOD: <10 NG/ML
LAB SAMPLE TYPE: NORMAL
LAB STATE REPORTED TO: NORMAL
LEAD, WHOLE BLOOD - HISTORICAL: <1 UG/DL
MERCURY, WHOLE BLOOD - HISTORICAL: <5 NG/ML
SPECIMEN STATUS: NORMAL

## 2019-08-30 ENCOUNTER — OFFICE VISIT (OUTPATIENT)
Dept: FAMILY MEDICINE | Facility: CLINIC | Age: 69
End: 2019-08-30
Payer: COMMERCIAL

## 2019-08-30 VITALS
WEIGHT: 144 LBS | DIASTOLIC BLOOD PRESSURE: 73 MMHG | SYSTOLIC BLOOD PRESSURE: 159 MMHG | OXYGEN SATURATION: 98 % | RESPIRATION RATE: 20 BRPM | HEART RATE: 60 BPM | TEMPERATURE: 97.8 F

## 2019-08-30 DIAGNOSIS — I10 BENIGN ESSENTIAL HYPERTENSION: Primary | ICD-10-CM

## 2019-08-30 NOTE — PROGRESS NOTES
Preceptor attestation:  Vital signs reviewed: BP (!) 159/73 (BP Location: Left arm, Patient Position: Sitting, Cuff Size: Adult Regular)   Pulse 60   Temp 97.8  F (36.6  C) (Oral)   Resp 20   Wt 65.3 kg (144 lb)   SpO2 98%     Patient seen, evaluated, and discussed with the resident.  I have verified the content of the note, which accurately reflects my assessment of the patient and the plan of care.    Supervising physician: Sandhya Newman MD  [facility]

## 2019-08-30 NOTE — PROGRESS NOTES
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  She takes her medications as prescribed, she does not miss doses.  She is currently on lisinopril 40 mg daily, amlodipine 10 mg daily and metoprolol succinate 200 mg daily.  She has no side effects from his medications.  She has been taking her blood pressure daily, she brings her log with her today.  Blood pressures look fantastic.  The highest systolic blood pressure in the last month was 147, otherwise blood pressures are largely in the 110-120 range.  Diastolic pressures are almost all within the 70s.  She also has been taking her pulse as requested by cardiology.  Her pulses are almost always in the 60s.  She also has her own simple system for what I interpret as whether she is in regular rhythm or atrial fibrillation.  It looks like over the last month per her records, she has been in atrial fibrillation during less than 5 checks.    Her pressures are high here in clinic, but notes that she quickly came into clinic and walked up the stairs.  Also notes that her blood pressure is high because \"her teenage granddaughter drove her here and who have high blood pressure after that\".    She tells me that she did follow-up with orthopedics regarding her knee and ankle.  There is nothing to be done about her ankle, it is basically immobile and physiologically fused.  But, she is having a right total knee replacement with the [facility] in the middle of December.    Her dizziness has " completely resolved.  She has had no episodes of up sounds like seizures.  She is almost back to being at her baseline for ambulation.  She has not had any chest pain or shortness of breath.  No headaches.    ROS:   A complete 12 point review of systems was negative except as stated above    Objective:  Vitals:    08/30/19 1523 08/30/19 1527   BP: (!) 171/76 (!) 159/73   BP Location: Left arm Left arm   Patient Position: Sitting Sitting   Cuff Size: Adult Regular Adult Regular   Pulse: 60    Resp: 20    Temp: 97.8  F (36.6  C)    TempSrc: Oral    SpO2: 98%    Weight: 65.3 kg (144 lb)      There is no height or weight on file to calculate BMI.    GEN: NAD, healthy, alert  EYES: grossly normal to inspection, PERRL, EOMI, normal conjunctivae/sclerae  HENT: Facial ecchymosis nearly resolved  NECK: no LAD, asymmetry  RESP: CTAB, no w/r/r  CV: Regular rate and rhythm, no murmurs  ABD: soft, NT/ND, no hepatosplenomegaly, no masses, no rebound, +BS throughout  MSK: gait is age appropriate w/o ataxia  NEURO: No nystagmus, mentation and speech intact  PSYCH: mentation appears normal, affect normal/bright    No results found for this or any previous visit (from the past 24 hour(s)).    Assessment/Plan:  Galina was seen today for hypertension.    Diagnoses and all orders for this visit:    Benign essential hypertension  Patient is here with extremely well controlled ambulatory blood pressures.  I think the pressures taken here in clinic are aberrancies.  We will make no medication changes at this point given her extremely well controlled.  I encouraged her to continue keeping blood pressure logs and to bring these in with each visit.  As noted, she was in regular rhythm today.  -She will follow-up with me for preoperative physical at the end of November.      Options for treatment and follow-up care were reviewed with the patient who was engaged and actively involved in the decision making process, verbalized understanding of  the options discussed, and satisfied with the final plan.    Patient was staffed with supervising physician, Sandhya Bryant.     Jose Bryant MD PGY2  [facility]

## 2019-08-30 NOTE — PATIENT INSTRUCTIONS
Thanks for coming in, Alan! Things are looking great.     Keep taking your blood pressure and pulse at home and bring the log to your next appointment (Pre-op)    No changes to your medications today.

## 2019-09-13 ENCOUNTER — OFFICE VISIT - HEALTHEAST (OUTPATIENT)
Dept: CARDIOLOGY | Facility: CLINIC | Age: 69
End: 2019-09-13

## 2019-09-13 DIAGNOSIS — I48.0 PAROXYSMAL ATRIAL FIBRILLATION (H): ICD-10-CM

## 2019-09-13 DIAGNOSIS — I63.9 CVA (CEREBRAL VASCULAR ACCIDENT) (H): ICD-10-CM

## 2019-09-13 DIAGNOSIS — I10 ACCELERATED HYPERTENSION: ICD-10-CM

## 2019-09-13 ASSESSMENT — MIFFLIN-ST. JEOR: SCORE: 1151.38

## 2019-11-18 ENCOUNTER — OFFICE VISIT (OUTPATIENT)
Dept: FAMILY MEDICINE | Facility: CLINIC | Age: 69
End: 2019-11-18
Payer: COMMERCIAL

## 2019-11-18 VITALS
HEART RATE: 63 BPM | OXYGEN SATURATION: 100 % | RESPIRATION RATE: 16 BRPM | TEMPERATURE: 98.1 F | WEIGHT: 152.8 LBS | BODY MASS INDEX: 26.09 KG/M2 | HEIGHT: 64 IN | SYSTOLIC BLOOD PRESSURE: 132 MMHG | DIASTOLIC BLOOD PRESSURE: 72 MMHG

## 2019-11-18 DIAGNOSIS — Z01.818 PREOP GENERAL PHYSICAL EXAM: Primary | ICD-10-CM

## 2019-11-18 LAB
BUN SERPL-MCNC: 22.4 MG/DL (ref 7–19)
CALCIUM SERPL-MCNC: 9.6 MG/DL (ref 8.5–10.1)
CHLORIDE SERPLBLD-SCNC: 105.4 MMOL/L (ref 98–110)
CO2 SERPL-SCNC: 26.9 MMOL/L (ref 20–32)
CREAT SERPL-MCNC: 0.8 MG/DL (ref 0.5–1)
GFR SERPL CREATININE-BSD FRML MDRD: 77.7 ML/MIN/1.7 M2
GLUCOSE SERPL-MCNC: 141.6 MG'DL (ref 70–99)
HCT VFR BLD AUTO: 38.4 % (ref 35–47)
HEMOGLOBIN: 12.1 G/DL (ref 11.7–15.7)
MCH RBC QN AUTO: 32.6 PG (ref 26.5–35)
MCHC RBC AUTO-ENTMCNC: 31.5 G/DL (ref 32–36)
MCV RBC AUTO: 103.5 FL (ref 78–100)
PLATELET # BLD AUTO: 318 K/UL (ref 150–450)
POTASSIUM SERPL-SCNC: 4.2 MMOL/DL (ref 3.2–4.6)
RBC # BLD AUTO: 3.7 M/UL (ref 3.8–5.2)
SODIUM SERPL-SCNC: 139.7 MMOL/L (ref 132–142)
WBC # BLD AUTO: 8.6 K/UL (ref 4–11)

## 2019-11-18 ASSESSMENT — MIFFLIN-ST. JEOR: SCORE: 1203.1

## 2019-11-18 NOTE — PATIENT INSTRUCTIONS
Before Your Surgery      Call your surgeon if there is any change in your health. This includes signs of a cold or flu (such as a sore throat, runny nose, cough, rash or fever).    Do not smoke, drink alcohol or take over the counter medicine (unless your surgeon or primary care doctor tells you to) for the 24 hours before and after surgery.    If you take prescribed drugs: Follow your doctor s orders about which medicines to take and which to stop until after surgery.    Eating and drinking prior to surgery: follow the instructions from your surgeon    Take a shower or bath the night before surgery. Use the soap your surgeon gave you to gently clean your skin. If you do not have soap from your surgeon, use your regular soap. Do not shave or scrub the surgery site.  Wear clean pajamas and have clean sheets on your bed.       Stop your Xarelto 2 days prior to surgery (Do not take starting Monday 12/2)  Do Not take your lisinopril the morning of surgery.  Take the rest of your medications with sips of water the morning of surgery.

## 2019-11-18 NOTE — RESULT ENCOUNTER NOTE
"Law -- Can you send the following letter to Alan? Thanks!    \"Dear Alan:    The results from the testing done at your last visit show the following: Your hemoglobin and platelets are both normal. The size of your red blood cells (MCV) is larger than normal, but this is not something that precludes you from having your operation done. Often times we repeat this lab and normalizes, so why don't you come back to clinic after the New Year and after you've recovered to repeat this if they don't in the hospital. Your electrolytes (sodium and potassium) are normal, as is your kidney function. Your Urea Nitrogen is a tad elevated, but similarly to the MCV it should not hold up your operation. I imagine this, too, will normalize with repeat testing.     Please do not hesitate to call the clinic with any questions.  Best of luck with your upcoming knee replacement! I expect you to graduate at the top of your class from \"[employer]\"    Dr. Jose Bryant  [facility]\""

## 2019-11-18 NOTE — PROGRESS NOTES
[facility] [address] [place] [address]  [phone]  Dept: [phone]    PRE-OP EVALUATION:  Today's date: 2019    Galina Barnes (: 1950) presents for pre-operative evaluation assessment as requested by Dr. Shahram Aguilar of [facility].  She requires evaluation and anesthesia risk assessment prior to undergoing right knee knee total arthoplasty .    Proposed Surgery/ Procedure: right knee total arthroplasty  Date of Surgery/ Procedure: 2019  Hospital/Surgical Facility: [facility]   Fax number for surgical facility: [phone]  Primary Physician: Jose Bryant  Type of Anesthesia Anticipated: Spinal    Patient has a Health Care Directive or Living Will:  NO    1. YES - Do you have a history of heart attack, stroke, stent, bypass or surgery on an artery in the head, neck, heart or legs?  2. NO - Do you ever have any pain or discomfort in your chest?  3. NO - Do you have a history of  Heart Failure?  4. NO - Are you troubled by shortness of breath when: walking on the level, up a slight hill or at night?  5. YES - Do you currently have a cold, bronchitis or other respiratory infection?  6. NO - Do you have a cough, shortness of breath or wheezing?  7. NO - Do you sometimes get pains in the calves of your legs when you walk?  8. NO - Do you or anyone in your family have previous history of blood clots?  9. NO - Do you or does anyone in your family have a serious bleeding problem such as prolonged bleeding following surgeries or cuts?  10. NO - Have you ever had problems with anemia or been told to take iron pills?  11. NO - Have you had any abnormal blood loss such as black, tarry or bloody stools, or abnormal vaginal bleeding?  12. NO - Have you ever had a blood transfusion?  13. NO - Have you or any of your relatives ever had problems with anesthesia?  14. NO - Do you have sleep apnea, excessive snoring or daytime drowsiness?  15. NO - Do you have any prosthetic heart  valves?  16. NO - Do you have prosthetic joints?  17. NO - Is there any chance that you may be pregnant?      HPI:     HPI related to upcoming procedure: Patient with long history of right knee pain that has been gradually getting worse over the past 5 years.  She did not have any trauma to this knee.  She has difficulty going both up and down the stairs and has pain in the knee at rest.  No swelling, redness, heat of the knee to suggest underlying inflammatory arthropathy.Prior to hospitalization earlier this year, patient had been without insurance.  Now that she has insurance she has been evaluated by [facility].  She was diagnosed with osteoarthritis of the right knee.  She elected to undergo right knee total arthroplasty.    See problem list for active medical problems.  Problems all longstanding and stable, except as noted/documented.  See ROS for pertinent symptoms related to these conditions.    A-FIB - Patient has a longstanding history of chronic A-fib currently on rate control. Current treatment regimen includes Rivaroxaban for stroke prevention and denies significant symptoms of lightheadedness, palpitations or dyspnea.       MEDICAL HISTORY:     Patient Active Problem List    Diagnosis Date Noted     Tension headache 06/12/2019     Priority: Medium     Benign essential hypertension 06/08/2019     Priority: Medium     Paroxysmal atrial fibrillation (H) 06/08/2019     Priority: Medium     Nonintractable epilepsy with complex partial seizures (H) 06/08/2019     Priority: Medium     History of CVA (cerebrovascular accident) 06/08/2019     Priority: Medium     Left putamen 5/2019.  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 06/08/2019     Priority: Medium     Gastroesophageal reflux disease without esophagitis 06/08/2019     Priority: Medium     Hyperlipidemia 06/08/2019     Priority: Medium      Past Medical History:   Diagnosis Date      Cerebral infarction (H)      Chronic osteoarthritis      Gastroesophageal reflux disease      Heart disease      Hypertension      Migraines      Seizures (H)      Uncomplicated asthma      Past Surgical History:   Procedure Laterality Date     Tubal Ligation  N/A 08/27/1975     Current Outpatient Medications   Medication Sig Dispense Refill     acetaminophen (TYLENOL) 325 MG tablet Take 1-2 tablets (325-650 mg) by mouth every 6 hours as needed for mild pain       amLODIPine (NORVASC) 10 MG tablet Take 1 tablet (10 mg) by mouth daily 90 tablet 1     atorvastatin (LIPITOR) 40 MG tablet Take 1 tablet (40 mg) by mouth daily 90 tablet 3     calcium carbonate (TUMS) 500 MG chewable tablet Take 1 tablet (500 mg) by mouth 2 times daily as needed for heartburn       famotidine (PEPCID) 20 MG tablet Take 1 tablet (20 mg) by mouth 2 times daily 120 tablet 3     levETIRAcetam (KEPPRA) 750 MG tablet Take 1 tablet (750 mg) by mouth 2 times daily 120 tablet 3     lisinopril (PRINIVIL/ZESTRIL) 40 MG tablet Take 1 tablet (40 mg) by mouth daily 90 tablet 3     metoprolol succinate ER (TOPROL-XL) 200 MG 24 hr tablet Take 1 tablet (200 mg) by mouth daily 90 tablet 3     order for DME Equipment being ordered: Digital home blood pressure monitor kit 1 each 0     rivaroxaban ANTICOAGULANT (XARELTO) 20 MG TABS tablet Take 1 tablet (20 mg) by mouth daily (with dinner) 90 tablet 3     OTC products: None, except as noted above    No Known Allergies   Latex Allergy: NO    Social History     Tobacco Use     Smoking status: Never Smoker     Smokeless tobacco: Never Used   Substance Use Topics     Alcohol use: Not Currently     History   Drug Use Unknown       REVIEW OF SYSTEMS:   CONSTITUTIONAL: NEGATIVE for fever, chills, change in weight  INTEGUMENTARY/SKIN: NEGATIVE for worrisome rashes, moles or lesions  EYES: NEGATIVE for vision changes or irritation  ENT/MOUTH: NEGATIVE for ear, mouth and throat problems  RESP: NEGATIVE for significant  "cough or SOB  BREAST: NEGATIVE for masses, tenderness or discharge  CV: NEGATIVE for chest pain, palpitations or peripheral edema  GI: NEGATIVE for nausea, abdominal pain, heartburn, or change in bowel habits  : NEGATIVE for frequency, dysuria, or hematuria  MUSCULOSKELETAL: Right knee pain  NEURO: NEGATIVE for weakness, dizziness or paresthesias  ENDOCRINE: NEGATIVE for temperature intolerance, skin/hair changes  HEME: NEGATIVE for bleeding problems  PSYCHIATRIC: NEGATIVE for changes in mood or affect    EXAM:   /72   Pulse 63   Temp 98.1  F (36.7  C) (Oral)   Resp 16   Ht 1.626 m (5' 4\")   Wt 69.3 kg (152 lb 12.8 oz)   SpO2 100%   BMI 26.23 kg/m      GENERAL APPEARANCE: healthy, alert and no distress     EYES: EOMI, PERRL     HENT: ear canals and TM's normal and nose and mouth without ulcers or lesions     NECK: no adenopathy, no asymmetry, masses, or scars and thyroid normal to palpation     RESP: lungs clear to auscultation - no rales, rhonchi or wheezes     CV: regular rates and rhythm, normal S1 S2, no S3 or S4 and no murmur, click or rub     ABDOMEN:  soft, nontender, no HSM or masses and bowel sounds normal     MS: Right knee tender to palpation, crepitous noted. ROM limited due to pain      SKIN: no suspicious lesions or rashes     NEURO: Normal strength and tone, sensory exam grossly normal, mentation intact and speech normal     PSYCH: mentation appears normal. and affect normal/bright     LYMPHATICS: No cervical adenopathy    DIAGNOSTICS:     EKG: appears normal, NSR, normal axis, normal intervals, no acute ST/T changes c/w ischemia, unchanged from previous tracings  Labs Drawn:   Recent Results (from the past 168 hour(s))   Basic Metabolic Panel ([place])    Collection Time: 11/18/19  3:15 PM   Result Value Ref Range    Urea Nitrogen 22.4 (H) 7.0 - 19.0 mg/dL    Calcium 9.6 8.5 - 10.1 mg/dL    Chloride 105.4 98.0 - 110.0 mmol/L    Carbon Dioxide 26.9 20.0 - 32.0 mmol/L    Creatinine 0.8 " 0.5 - 1.0 mg/dL    Glucose 141.6 (H) 70.0 - 99.0 mg'dL    Potassium 4.2 3.2 - 4.6 mmol/dL    Sodium 139.7 132.0 - 142.0 mmol/L    GFR Estimate 77.7 >60.0 mL/min/1.7 m2    GFR Estimate If Black >90 >60.0 mL/min/1.7 m2   CBC with Plt ([facility])    Collection Time: 11/18/19  3:15 PM   Result Value Ref Range    WBC 8.6 4.0 - 11.0 K/uL    RBC 3.7 (L) 3.8 - 5.2 M/uL    Hemoglobin 12.1 11.7 - 15.7 g/dL    Hematocrit 38.4 35.0 - 47.0 %    .5 (H) 78.0 - 100.0 fL    MCH 32.6 26.5 - 35.0    MCHC 31.5 (L) 32.0 - 36.0 g/dL    Platelets 318.0 150.0 - 450.0 K/uL       IMPRESSION:   Reason for surgery/procedure: Osteoarthritis of the right knee    The proposed surgical procedure is considered INTERMEDIATE risk.    REVISED CARDIAC RISK INDEX  The patient has the following serious cardiovascular risks for perioperative complications such as (MI, PE, VFib and 3  AV Block):  Cerebrovascular Disease (TIA or CVA)  INTERPRETATION: 1 risks: Class II (low risk - 0.9% complication rate)    The patient has the following additional risks for perioperative complications:  Paroxysmal Atrial Fibrillation       ICD-10-CM    1. Preop general physical exam Z01.818 Basic Metabolic Panel ([place])     CBC with Plt ([facility])     EKG 12-lead complete w/read - Clinics       RECOMMENDATIONS:     --Consult hospital rounder / IM to assist post-op medical management    Cardiovascular Risk  Performs 4 METs exercise without symptoms (Climb a flight of stairs) .   Patient is already on a Beta Blocker. Continue Betablocker therapy after surgery, using Beta blocker order set as necessary for NPO status.    --Patient is to take all scheduled medications on the day of surgery EXCEPT for modifications listed below.    ACE Inhibitor or Angiotensin Receptor Blocker (ARB) Use  Ace inhibitor or Angiotensin Receptor Blocker (ARB) and should HOLD this medication the morning of surgery.    Anticoagulation  Patient is currently on Xarelto for anticoagulation in the  setting of paroxysmal atrial fibrillation.  Normal LV function noted on echo this year.  She has been recently seen by cardiology who agree with holding Xarelto for surgery, and due to normal LV function she does not need bridging.  --Hold Xarelto starting 48 hours prior to procedure    APPROVAL GIVEN to proceed with proposed procedure, without further diagnostic evaluation     Patient discussed with attending physician Dr. Job Valdes who agrees with the plan.     Signed Electronically by: Jose Bryant MD    Copy of this evaluation report is provided to requesting physician.    [facility] Preop Guidelines    Revised Cardiac Risk Index

## 2019-11-18 NOTE — LETTER
"November 19, 2019      Galina Barnes  [address]   [place] [address]        Dear Alan,    The results from the testing done at your last visit show the following: Your hemoglobin and platelets are both normal. The size of your red blood cells (MCV) is larger than normal, but this is not something that precludes you from having your operation done. Often times we repeat this lab and normalizes, so why don't you come back to clinic after the New Year and after you've recovered to repeat this if they don't in the hospital. Your electrolytes (sodium and potassium) are normal, as is your kidney function. Your Urea Nitrogen is a tad elevated, but similarly to the MCV it should not hold up your operation. I imagine this, too, will normalize with repeat testing.     Please do not hesitate to call the clinic with any questions.   Best of luck with your upcoming knee replacement! I expect you to graduate at the top of your class from \"[employer]\"     Please see below for your test results.    Resulted Orders   Basic Metabolic Panel ([place])   Result Value Ref Range    Urea Nitrogen 22.4 (H) 7.0 - 19.0 mg/dL    Calcium 9.6 8.5 - 10.1 mg/dL    Chloride 105.4 98.0 - 110.0 mmol/L    Carbon Dioxide 26.9 20.0 - 32.0 mmol/L    Creatinine 0.8 0.5 - 1.0 mg/dL    Glucose 141.6 (H) 70.0 - 99.0 mg'dL    Potassium 4.2 3.2 - 4.6 mmol/dL    Sodium 139.7 132.0 - 142.0 mmol/L    GFR Estimate 77.7 >60.0 mL/min/1.7 m2    GFR Estimate If Black >90 >60.0 mL/min/1.7 m2   CBC with Plt ([facility])   Result Value Ref Range    WBC 8.6 4.0 - 11.0 K/uL    RBC 3.7 (L) 3.8 - 5.2 M/uL    Hemoglobin 12.1 11.7 - 15.7 g/dL    Hematocrit 38.4 35.0 - 47.0 %    .5 (H) 78.0 - 100.0 fL    MCH 32.6 26.5 - 35.0    MCHC 31.5 (L) 32.0 - 36.0 g/dL    Platelets 318.0 150.0 - 450.0 K/uL       If you have any questions, please call the clinic to make an appointment.    Sincerely,    Jose Bryant MD  "

## 2019-11-18 NOTE — PROGRESS NOTES
Preceptor Attestation:   Patient seen, evaluated and discussed with the resident. I personally viewed the EKG and agree with the interpretation documented by the resident. I have verified the content of the note, which accurately reflects my assessment of the patient and the plan of care.   Supervising Physician:  Luis Valdes MD.

## 2019-11-27 ASSESSMENT — MIFFLIN-ST. JEOR: SCORE: 1178.6

## 2019-12-01 DIAGNOSIS — I10 BENIGN ESSENTIAL HYPERTENSION: ICD-10-CM

## 2019-12-03 RX ORDER — AMLODIPINE BESYLATE 10 MG/1
10 TABLET ORAL DAILY
Qty: 90 TABLET | Refills: 3 | Status: SHIPPED | OUTPATIENT
Start: 2019-12-03 | End: 2020-09-25

## 2019-12-04 ENCOUNTER — ANESTHESIA - HEALTHEAST (OUTPATIENT)
Dept: SURGERY | Facility: CLINIC | Age: 69
End: 2019-12-04

## 2019-12-04 ENCOUNTER — SURGERY - HEALTHEAST (OUTPATIENT)
Dept: SURGERY | Facility: CLINIC | Age: 69
End: 2019-12-04

## 2019-12-04 ASSESSMENT — MIFFLIN-ST. JEOR: SCORE: 1179.05

## 2020-02-14 DIAGNOSIS — K21.9 GASTROESOPHAGEAL REFLUX DISEASE WITHOUT ESOPHAGITIS: ICD-10-CM

## 2020-02-14 RX ORDER — FAMOTIDINE 20 MG/1
20 TABLET, FILM COATED ORAL 2 TIMES DAILY
Qty: 120 TABLET | Refills: 2 | Status: SHIPPED | OUTPATIENT
Start: 2020-02-14 | End: 2020-08-10

## 2020-03-06 ENCOUNTER — RECORDS - HEALTHEAST (OUTPATIENT)
Dept: ADMINISTRATIVE | Facility: OTHER | Age: 70
End: 2020-03-06

## 2020-04-13 ENCOUNTER — TELEPHONE (OUTPATIENT)
Dept: FAMILY MEDICINE | Facility: CLINIC | Age: 70
End: 2020-04-13

## 2020-04-13 NOTE — TELEPHONE ENCOUNTER
Reached out to patient during COVID19 Clinic outreach. Reassured patient that [facility] is still open and has started implementing phone and video appointments to help patient remain safe at home.     Patient reports the following concerns: she did not have any concerns but will call if she has any concerns.     Per patient request, patient is scheduled for a visit to address their concerns on the following date:      Offered MyChart. Patient accepted.    Note will be routed to no one  to assist in addressing patient concerns and/or to schedule a visit.     Eva Marinelli

## 2020-05-15 DIAGNOSIS — E78.5 HYPERLIPIDEMIA, UNSPECIFIED HYPERLIPIDEMIA TYPE: ICD-10-CM

## 2020-05-15 DIAGNOSIS — I10 BENIGN ESSENTIAL HYPERTENSION: ICD-10-CM

## 2020-05-15 DIAGNOSIS — I48.0 PAROXYSMAL ATRIAL FIBRILLATION (H): ICD-10-CM

## 2020-05-15 RX ORDER — RIVAROXABAN 20 MG/1
TABLET, FILM COATED ORAL
Qty: 90 TABLET | Refills: 0 | Status: SHIPPED | OUTPATIENT
Start: 2020-05-15 | End: 2020-08-31

## 2020-05-17 RX ORDER — ATORVASTATIN CALCIUM 40 MG/1
40 TABLET, FILM COATED ORAL DAILY
Qty: 90 TABLET | Refills: 0 | Status: SHIPPED | OUTPATIENT
Start: 2020-05-17 | End: 2020-09-25

## 2020-05-17 RX ORDER — LISINOPRIL 40 MG/1
40 TABLET ORAL DAILY
Qty: 90 TABLET | Refills: 0 | Status: SHIPPED | OUTPATIENT
Start: 2020-05-17 | End: 2020-09-25

## 2020-05-17 RX ORDER — METOPROLOL SUCCINATE 200 MG/1
200 TABLET, EXTENDED RELEASE ORAL DAILY
Qty: 90 TABLET | Refills: 0 | Status: SHIPPED | OUTPATIENT
Start: 2020-05-17 | End: 2020-09-25

## 2020-07-15 ENCOUNTER — OFFICE VISIT - HEALTHEAST (OUTPATIENT)
Dept: CARDIOLOGY | Facility: CLINIC | Age: 70
End: 2020-07-15

## 2020-07-15 DIAGNOSIS — I48.0 PAROXYSMAL ATRIAL FIBRILLATION (H): ICD-10-CM

## 2020-07-15 DIAGNOSIS — I10 BENIGN ESSENTIAL HYPERTENSION: ICD-10-CM

## 2020-08-07 DIAGNOSIS — K21.9 GASTROESOPHAGEAL REFLUX DISEASE WITHOUT ESOPHAGITIS: ICD-10-CM

## 2020-08-10 RX ORDER — FAMOTIDINE 20 MG/1
TABLET, FILM COATED ORAL
Qty: 120 TABLET | Refills: 0 | Status: SHIPPED | OUTPATIENT
Start: 2020-08-10 | End: 2020-10-30

## 2020-08-31 DIAGNOSIS — I48.0 PAROXYSMAL ATRIAL FIBRILLATION (H): ICD-10-CM

## 2020-08-31 RX ORDER — RIVAROXABAN 20 MG/1
TABLET, FILM COATED ORAL
Qty: 90 TABLET | Refills: 3 | Status: SHIPPED | OUTPATIENT
Start: 2020-08-31 | End: 2021-08-16

## 2020-09-06 DIAGNOSIS — G40.209 PARTIAL SYMPTOMATIC EPILEPSY WITH COMPLEX PARTIAL SEIZURES, NOT INTRACTABLE, WITHOUT STATUS EPILEPTICUS (H): ICD-10-CM

## 2020-09-06 NOTE — LETTER
9/6/2020        RE: Galina Barnes  [address]  [facility] [place] [facility] [place] [address]          Dear Alan,        We recently provided you with medication refills.  Many medications require routine follow-up with your doctor.    Your prescription(s) have been refilled for 30 days so you may have time for the above noted follow-up. Please call to schedule soon so we can assure you have an appointment before your next refills are needed. If you have already made a follow up appointment, please disregard this letter.           Sincerely,        [facility]     (Formerly known as [facility]

## 2020-09-08 RX ORDER — LEVETIRACETAM 750 MG/1
TABLET ORAL
Qty: 60 TABLET | Refills: 0 | Status: SHIPPED | OUTPATIENT
Start: 2020-09-08 | End: 2020-09-29

## 2020-09-08 NOTE — TELEPHONE ENCOUNTER
Letter mailed to pt  Medication T'd for review and signature  Lynette Corley CMA on 9/8/2020 at 10:21 AM

## 2020-09-24 DIAGNOSIS — E78.5 HYPERLIPIDEMIA, UNSPECIFIED HYPERLIPIDEMIA TYPE: ICD-10-CM

## 2020-09-24 DIAGNOSIS — I48.0 PAROXYSMAL ATRIAL FIBRILLATION (H): ICD-10-CM

## 2020-09-24 DIAGNOSIS — I10 BENIGN ESSENTIAL HYPERTENSION: ICD-10-CM

## 2020-09-25 RX ORDER — AMLODIPINE BESYLATE 10 MG/1
10 TABLET ORAL DAILY
Qty: 90 TABLET | Refills: 3 | Status: SHIPPED | OUTPATIENT
Start: 2020-09-25 | End: 2021-08-16

## 2020-09-25 RX ORDER — LISINOPRIL 40 MG/1
40 TABLET ORAL DAILY
Qty: 90 TABLET | Refills: 3 | Status: SHIPPED | OUTPATIENT
Start: 2020-09-25 | End: 2021-08-16

## 2020-09-25 RX ORDER — METOPROLOL SUCCINATE 200 MG/1
200 TABLET, EXTENDED RELEASE ORAL DAILY
Qty: 90 TABLET | Refills: 3 | Status: SHIPPED | OUTPATIENT
Start: 2020-09-25 | End: 2021-08-16

## 2020-09-25 RX ORDER — ATORVASTATIN CALCIUM 40 MG/1
40 TABLET, FILM COATED ORAL DAILY
Qty: 90 TABLET | Refills: 3 | Status: SHIPPED | OUTPATIENT
Start: 2020-09-25 | End: 2021-08-16

## 2020-09-29 ENCOUNTER — VIRTUAL VISIT (OUTPATIENT)
Dept: NEUROLOGY | Facility: CLINIC | Age: 70
End: 2020-09-29
Payer: COMMERCIAL

## 2020-09-29 VITALS — HEIGHT: 64 IN | WEIGHT: 160 LBS | BODY MASS INDEX: 27.31 KG/M2

## 2020-09-29 DIAGNOSIS — G40.209 PARTIAL SYMPTOMATIC EPILEPSY WITH COMPLEX PARTIAL SEIZURES, NOT INTRACTABLE, WITHOUT STATUS EPILEPTICUS (H): ICD-10-CM

## 2020-09-29 PROCEDURE — 99214 OFFICE O/P EST MOD 30 MIN: CPT | Mod: 95 | Performed by: PSYCHIATRY & NEUROLOGY

## 2020-09-29 RX ORDER — LEVETIRACETAM 750 MG/1
750 TABLET ORAL 2 TIMES DAILY
Qty: 180 TABLET | Refills: 3 | Status: SHIPPED | OUTPATIENT
Start: 2020-09-29 | End: 2021-08-16

## 2020-09-29 ASSESSMENT — MIFFLIN-ST. JEOR: SCORE: 1230.76

## 2020-09-29 NOTE — NURSING NOTE
Chief Complaint   Patient presents with     Seizures     Annual follow up - doing well      Telephone visit     Call [phone]     Lynette Corley CMA on 9/29/2020 at 12:24 PM

## 2020-09-29 NOTE — PROGRESS NOTES
NEUROLOGY FOLLOW UP VISIT  NOTE       [facility] NEUROLOGY [place]  [address] [place] [address]  Tel: [phone]  Fax: [phone]  [email or web address]     Galina Barnes,  1950, MRN [id number]  PCP: Jose Bryant, [phone]  Date: 2020     ASSESSMENT & PLAN     Diagnosis code: Partial symptomatic epilepsy with complex partial seizures, not intractable, without status epilepticus (H)     Complex partial seizure  Pleasant 70-year-old female with history of atrial fibrillation, hypertension who was admitted to [facility] in May 2019 with left basal ganglia infarct.  Part of work-up included EEG that showed left temporal sharp activity.  She was started on Keppra and since then has remained symptom-free.  I have refilled her prescription for Keppra and I am checking Keppra level and electrolyte panel.  Follow-up will be in person in 1 year    Cognitive decline  During her previous visit daughter had reported that patient was having some memory difficulty.  She had MRI of the brain that showed multiple areas of hemosiderin blooming signal within bilateral basal ganglia, thalami, cerebral and cerebellar hemisphere and brainstem that raise the possibility of vascular dementia.  She had lab work of common causes of dementia that was normal.  Patient feels her memory has improved and is keeping herself busy by doing some brain exercises and is not interested in adding any medication.  I do suspect we might be dealing with early vascular dementia and down the road right need to consider adding Aricept    Left basal ganglia infarction  Patient was admitted to [facility] in May 2019 with left basal ganglia infarction due to her multiple risk factor.  She is currently on Xarelto and has made complete recovery.    Thank you again for this referral, please feel free to contact me if you have any questions.    Jero Saravia MD  [facility] NEUROLOGY,  [place]  (Formerly, [facility]     HISTORY OF PRESENT ILLNESS     Patient is a 70 years old female with history of atrial fibrillation, hypertension who was admitted to [facility] in May 2019 with acute onset of dizziness. She had a MRI of the head that showed a left basal ganglia infarct and additionally multiple areas of hemosiderin blooming signal within bilateral basal ganglia, thalami, cerebral and cerebellar hemisphere was noted. Echocardiogram showed normal ejection fraction. She also had an EEG that showed left temporal sharp activity suggesting low threshold for seizure. She was started on Keppra and since discharge has not experienced any episodes of dizziness or loss of consciousness. She also had some confusion during hospitalization and had a Pascual cognitive assessment and scored 22/30. Subsequently as an outpatient her Pascual cognitive assessment score was 26. She does not think she has any memory issue but daughter does report that at times she tends to tell her same thing over and over again that patient thinks it is because she is bored.  She has been on Keppra and seems to be tolerating it well.  She denies any seizures since her last visit.  Previously her daughter had complained of some cognitive issue but she had some lab work that included normal B12, folate, RPR, Lyme titer, methylmalonic acid level and heavy metal panel.  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    Surgical History:  She  has a past surgical history that includes Hysterectomy (N/A, 08/27/1975).     SOCIAL HISTORY     Reviewed, and she  reports that she has never smoked. She has never used smokeless tobacco. She reports previous alcohol use. She reports that she does not use drugs.     FAMILY HISTORY     Reviewed, and family history includes Diabetes in an other family member.     ALLERGIES     No Known Allergies      REVIEW OF SYSTEMS     A 12 point review of system was performed and was negative except as outlined in the history of present illness.     HOME MEDICATIONS       Current Outpatient Medications:      acetaminophen (TYLENOL) 325 MG tablet, Take 1-2 tablets (325-650 mg) by mouth every 6 hours as needed for mild pain, Disp: , Rfl:      amLODIPine (NORVASC) 10 MG tablet, Take 1 tablet (10 mg) by mouth daily, Disp: 90 tablet, Rfl: 3     atorvastatin (LIPITOR) 40 MG tablet, Take 1 tablet (40 mg) by mouth daily, Disp: 90 tablet, Rfl: 3     calcium carbonate (TUMS) 500 MG chewable tablet, Take 1 tablet (500 mg) by mouth 2 times daily as needed for heartburn, Disp: , Rfl:      famotidine (PEPCID) 20 MG tablet, TAKE ONE TABLET BY MOUTH TWICE DAILY , Disp: 120 tablet, Rfl: 0     levETIRAcetam (KEPPRA) 750 MG tablet, TAKE ONE TABLET BY MOUTH TWICE DAILY. need appt for further refills, Disp: 60 tablet, Rfl: 0     lisinopril (ZESTRIL) 40 MG tablet, Take 1 tablet (40 mg) by mouth daily, Disp: 90 tablet, Rfl: 3     metoprolol succinate ER (TOPROL-XL) 200 MG 24 hr tablet, Take 1 tablet (200 mg) by mouth daily, Disp: 90 tablet, Rfl: 3     order for DME, Equipment being ordered: Digital home blood pressure monitor kit, Disp: 1 each, Rfl: 0     " XARELTO ANTICOAGULANT 20 MG TABS tablet, TAKE 1 TABLET (20 MG) BY MOUTH DAILY (WITH DINNER), Disp: 90 tablet, Rfl: 3      PHYSICAL EXAM     Vital signs  Ht 1.626 m (5' 4\")   Wt 72.6 kg (160 lb)   BMI 27.46 kg/m      Weight:   160 lbs 0 oz    GENERAL PHYSICAL EXAM: Patient is alert and oriented x 4 in no acute distress. Neck was supple.  NEUROLOGICAL EXAM:  Patient is alert and oriented x3 no acute distress speech normal with no dysarthria or aphasia.  She can tell me her name, date place.  She can do serial sevens and she can spell world backwards.  She denies any focal weakness.     DIAGNOSTIC STUDIES     PERTINENT RADIOLOGY  Following imaging studies were reviewed:     HEAD MRI: 5/21/19  1. There is a possible punctate focus of acute or early subacute cerebral infarction involving the anterior left putamen. No associated mass effect.  2. There are innumerable foci of hemosiderin blooming within the bilateral basal ganglia, thalami, cerebral and cerebellar hemispheres and brainstem. These are of indeterminate nature but may reflect changes relating to chronic hypertension an amyloid   angiopathy.  3. Underlying multifocal chronic lacunar infarcts in the basal ganglia, thalami, and anterior corpus callosum.  4. Underlying advanced presumed chronic small vessel ischemic changes.    HEAD MRA:   1. No major intracranial flow-limiting arterial stenosis or occlusion.  2. 2.5 mm aneurysm arising at the junction of the left A1 and A2 segments.    NECK MRA:  1. No significant stenosis in the neck vessels based on NASCET criteria.  2. No evidence for dissection or pseudoaneurysm.    EEG 5/23/19  This is an abnormal EEG due to left hemispheric sharp discharges in the temporal head region that suggest a low threshold for focal seizure.  Underlying background is normal.    ECHOCARDIOGRAM 5/23/2019  Normal left ventricular size and systolic performance with a visually estimated ejection fraction of 60%.   There is mild aortic " "insufficiency.   Normal right ventricular size and systolic performance.   There is mild left atrial enlargement       PERTINENT LABS  Following labs were reviewed:  No visits with results within 3 Month(s) from this visit.   Latest known visit with results is:      Result Name Current Result Reference Range   Sodium Level (mmol/L)  141 8/5/2019 136 - 145   Potassium Level (mmol/L)  4.0 8/5/2019 3.5 - 5.0   CO2 Level (mmol/L)  25 8/5/2019 22 - 31   Chloride Level (mmol/L) ((H)) 109 8/5/2019 98 - 107   AGAP (mmol/L)  7 8/5/2019 5 - 18   Levetiracetam (Keppra) Level (ug/mL) ((H)) 61.0 8/5/2019 6.0 - 46.0   Vitamin B12 Level (pg/mL)  440 8/5/2019 213 - 816   Folate (ng/mL)  18.7 8/5/2019 >=3.5 -    Treponema Ab  Negative 8/5/2019 Negative -    Lyme Ab  0.10 8/5/2019  - <0.90   Methylmalonic Acid (umol/L)  0.13 8/5/2019 0.00 - 0.40   Arsenic, Whole Bld (ng/mL)  <10 8/5/2019  - <10   Lead, Whole Blood (ug/dL)  <1 8/5/2019  - <5   Metals Comments I  [place] 8/5/2019    Sample Type  Venous 8/5/2019    Mercury, Whole Blood (ng/mL)  <5 8/5/2019  - <8     TELEPHONE VISIT CONSENT   This telephone visit was done in lieu of a face to face visit due to COVID 19 pandemic. The patient has been notified of following:    \"This telephone visit will be conducted via a call between you and your physician/provider. We have found that certain health care needs can be provided without the need for a physical exam. This service lets us provide the care you need with a short phone conversation. If a prescription is necessary we can send it directly to your pharmacy. If lab work is needed we can place an order for that and you can then stop by our lab to have the test done at a later time. If during the course of the call the physician/provider feels a telephone visit is not appropriate, you will not be charged for this service.\"    Patient has given verbal consent for Telephone visit? YES  Consent has been obtained for this service by 1 care team " member: YES    Total Time: visit 30 minutes     Total time spent for face to face visit, reviewing labs/imaging studies, counseling and coordination of care was: 30 Minutes More than 50% of this time was spent on counseling and coordination of care.      This note was dictated using voice recognition software.  Any grammatical or context distortions are unintentional and inherent to the software.

## 2020-09-29 NOTE — LETTER
2020         RE: Galina Barnes  [address]  [facility] [address]        Dear Colleague,    Thank you for referring your patient, Galina Barnes, to the [facility] NEUROLOGY [place]. Please see a copy of my visit note below.    NEUROLOGY FOLLOW UP VISIT  NOTE       [facility] NEUROLOGY [place]  [address] [place] [address]  Tel: [phone]  Fax: [phone]  [email or web address]     Galina Barnes,  1950, MRN [id number]  PCP: Jose Bryant, [phone]  Date: 2020     ASSESSMENT & PLAN     Diagnosis code: Partial symptomatic epilepsy with complex partial seizures, not intractable, without status epilepticus (H)     Complex partial seizure  Pleasant 70-year-old female with history of atrial fibrillation, hypertension who was admitted to [facility] in May 2019 with left basal ganglia infarct.  Part of work-up included EEG that showed left temporal sharp activity.  She was started on Keppra and since then has remained symptom-free.  I have refilled her prescription for Keppra and I am checking Keppra level and electrolyte panel.  Follow-up will be in person in 1 year    Cognitive decline  During her previous visit daughter had reported that patient was having some memory difficulty.  She had MRI of the brain that showed multiple areas of hemosiderin blooming signal within bilateral basal ganglia, thalami, cerebral and cerebellar hemisphere and brainstem that raise the possibility of vascular dementia.  She had lab work of common causes of dementia that was normal.  Patient feels her memory has improved and is keeping herself busy by doing some brain exercises and is not interested in adding any medication.  I do suspect we might be dealing with early vascular dementia and down the road right need to consider adding Aricept    Left basal ganglia infarction  Patient was admitted to [facility] in May 2019 with left basal ganglia  infarction due to her multiple risk factor.  She is currently on Xarelto and has made complete recovery.    Thank you again for this referral, please feel free to contact me if you have any questions.    Jero Saravia MD  [facility]  (Formerly, [facility]     HISTORY OF PRESENT ILLNESS     Patient is a 70 years old female with history of atrial fibrillation, hypertension who was admitted to [facility] in May 2019 with acute onset of dizziness. She had a MRI of the head that showed a left basal ganglia infarct and additionally multiple areas of hemosiderin blooming signal within bilateral basal ganglia, thalami, cerebral and cerebellar hemisphere was noted. Echocardiogram showed normal ejection fraction. She also had an EEG that showed left temporal sharp activity suggesting low threshold for seizure. She was started on Keppra and since discharge has not experienced any episodes of dizziness or loss of consciousness. She also had some confusion during hospitalization and had a Pascual cognitive assessment and scored 22/30. Subsequently as an outpatient her Pascual cognitive assessment score was 26. She does not think she has any memory issue but daughter does report that at times she tends to tell her same thing over and over again that patient thinks it is because she is bored.  She has been on Keppra and seems to be tolerating it well.  She denies any seizures since her last visit.  Previously her daughter had complained of some cognitive issue but she had some lab work that included normal B12, folate, RPR, Lyme titer, methylmalonic acid level and heavy metal panel.  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    Surgical History:  She  has a past surgical history that includes Hysterectomy (N/A, 08/27/1975).     SOCIAL HISTORY     Reviewed, and she  reports that she has never smoked. She has never used smokeless tobacco. She reports previous alcohol use. She reports that she does not use drugs.     FAMILY HISTORY     Reviewed, and family history includes Diabetes in an other family member.     ALLERGIES     No Known Allergies      REVIEW OF SYSTEMS     A 12 point review of system was performed and was negative except as outlined in the history of present illness.     HOME MEDICATIONS       Current Outpatient Medications:      acetaminophen (TYLENOL) 325 MG tablet, Take 1-2 tablets (325-650 mg) by mouth every 6 hours as needed for mild pain, Disp: , Rfl:      amLODIPine (NORVASC) 10 MG tablet, Take 1 tablet (10 mg) by mouth daily, Disp: 90 tablet, Rfl: 3     atorvastatin (LIPITOR) 40 MG tablet, Take 1 tablet (40 mg) by mouth daily, Disp: 90 tablet, Rfl: 3     calcium carbonate (TUMS) 500 MG chewable tablet, Take 1 tablet (500 mg) by mouth 2 times daily as needed for heartburn, Disp: , Rfl:      famotidine (PEPCID) 20 MG tablet, TAKE ONE TABLET BY MOUTH TWICE DAILY , Disp: 120 tablet, Rfl: 0     levETIRAcetam (KEPPRA) 750 MG tablet, TAKE ONE TABLET BY MOUTH TWICE DAILY. need appt for further refills, Disp: 60 tablet, Rfl: 0     lisinopril (ZESTRIL) 40 MG tablet, Take 1 tablet (40 mg) by mouth daily, Disp: 90  "tablet, Rfl: 3     metoprolol succinate ER (TOPROL-XL) 200 MG 24 hr tablet, Take 1 tablet (200 mg) by mouth daily, Disp: 90 tablet, Rfl: 3     order for DME, Equipment being ordered: Digital home blood pressure monitor kit, Disp: 1 each, Rfl: 0     XARELTO ANTICOAGULANT 20 MG TABS tablet, TAKE 1 TABLET (20 MG) BY MOUTH DAILY (WITH DINNER), Disp: 90 tablet, Rfl: 3      PHYSICAL EXAM     Vital signs  Ht 1.626 m (5' 4\")   Wt 72.6 kg (160 lb)   BMI 27.46 kg/m      Weight:   160 lbs 0 oz    GENERAL PHYSICAL EXAM: Patient is alert and oriented x 4 in no acute distress. Neck was supple.  NEUROLOGICAL EXAM:  Patient is alert and oriented x3 no acute distress speech normal with no dysarthria or aphasia.  She can tell me her name, date place.  She can do serial sevens and she can spell world backwards.  She denies any focal weakness.     DIAGNOSTIC STUDIES     PERTINENT RADIOLOGY  Following imaging studies were reviewed:     HEAD MRI: 5/21/19  1. There is a possible punctate focus of acute or early subacute cerebral infarction involving the anterior left putamen. No associated mass effect.  2. There are innumerable foci of hemosiderin blooming within the bilateral basal ganglia, thalami, cerebral and cerebellar hemispheres and brainstem. These are of indeterminate nature but may reflect changes relating to chronic hypertension an amyloid   angiopathy.  3. Underlying multifocal chronic lacunar infarcts in the basal ganglia, thalami, and anterior corpus callosum.  4. Underlying advanced presumed chronic small vessel ischemic changes.    HEAD MRA:   1. No major intracranial flow-limiting arterial stenosis or occlusion.  2. 2.5 mm aneurysm arising at the junction of the left A1 and A2 segments.    NECK MRA:  1. No significant stenosis in the neck vessels based on NASCET criteria.  2. No evidence for dissection or pseudoaneurysm.    EEG 5/23/19  This is an abnormal EEG due to left hemispheric sharp discharges in the temporal " "head region that suggest a low threshold for focal seizure.  Underlying background is normal.    ECHOCARDIOGRAM 5/23/2019  Normal left ventricular size and systolic performance with a visually estimated ejection fraction of 60%.   There is mild aortic insufficiency.   Normal right ventricular size and systolic performance.   There is mild left atrial enlargement       PERTINENT LABS  Following labs were reviewed:  No visits with results within 3 Month(s) from this visit.   Latest known visit with results is:      Result Name Current Result Reference Range   Sodium Level (mmol/L)  141 8/5/2019 136 - 145   Potassium Level (mmol/L)  4.0 8/5/2019 3.5 - 5.0   CO2 Level (mmol/L)  25 8/5/2019 22 - 31   Chloride Level (mmol/L) ((H)) 109 8/5/2019 98 - 107   AGAP (mmol/L)  7 8/5/2019 5 - 18   Levetiracetam (Keppra) Level (ug/mL) ((H)) 61.0 8/5/2019 6.0 - 46.0   Vitamin B12 Level (pg/mL)  440 8/5/2019 213 - 816   Folate (ng/mL)  18.7 8/5/2019 >=3.5 -    Treponema Ab  Negative 8/5/2019 Negative -    Lyme Ab  0.10 8/5/2019  - <0.90   Methylmalonic Acid (umol/L)  0.13 8/5/2019 0.00 - 0.40   Arsenic, Whole Bld (ng/mL)  <10 8/5/2019  - <10   Lead, Whole Blood (ug/dL)  <1 8/5/2019  - <5   Metals Comments I  [place] 8/5/2019    Sample Type  Venous 8/5/2019    Mercury, Whole Blood (ng/mL)  <5 8/5/2019  - <8     TELEPHONE VISIT CONSENT   This telephone visit was done in lieu of a face to face visit due to COVID 19 pandemic. The patient has been notified of following:    \"This telephone visit will be conducted via a call between you and your physician/provider. We have found that certain health care needs can be provided without the need for a physical exam. This service lets us provide the care you need with a short phone conversation. If a prescription is necessary we can send it directly to your pharmacy. If lab work is needed we can place an order for that and you can then stop by our lab to have the test done at a later time. If during " "the course of the call the physician/provider feels a telephone visit is not appropriate, you will not be charged for this service.\"    Patient has given verbal consent for Telephone visit? YES  Consent has been obtained for this service by 1 care team member: YES    Total Time: visit 30 minutes     Total time spent for face to face visit, reviewing labs/imaging studies, counseling and coordination of care was: 30 Minutes More than 50% of this time was spent on counseling and coordination of care.      This note was dictated using voice recognition software.  Any grammatical or context distortions are unintentional and inherent to the software.           Again, thank you for allowing me to participate in the care of your patient.        Sincerely,        Jero Saravia MD    "

## 2020-10-30 DIAGNOSIS — K21.9 GASTROESOPHAGEAL REFLUX DISEASE WITHOUT ESOPHAGITIS: ICD-10-CM

## 2020-10-30 RX ORDER — FAMOTIDINE 20 MG/1
TABLET, FILM COATED ORAL
Qty: 120 TABLET | Refills: 3 | Status: SHIPPED | OUTPATIENT
Start: 2020-10-30 | End: 2021-06-28

## 2021-01-03 ENCOUNTER — HEALTH MAINTENANCE LETTER (OUTPATIENT)
Age: 71
End: 2021-01-03

## 2021-05-29 NOTE — PROGRESS NOTES
[facility]    DATE: 2019    NAME:  Galina Barnes             :  1950  MRN: [id number]  CODE STATUS:  FULL CODE    VISIT TYPE: Problem Visit (hospital f/u)     FACILITY:  [facility] [[id number]]       CHIEF COMPLAIN/REASON FOR VISIT:    Chief Complaint   Patient presents with     Problem Visit     hospital f/u               HISTORY OF PRESENT ILLNESS: Galina Barnes is a 68 y.o. female who was admitted - for hypertensive urgency, A fib RVR, subacute Left putamen infarct. She had vertigo and fall with facial trauma at home. EEG suggested complex partial seizures. She was started on keppra and dose increased after another possible seizure/unresponsive episode on . Neurology Dr. Saravia followed during stay. She was recommended to f/u with neurology in 6-8 weeks. She was referred to ENT as outpatient for vestibulopathy and family history of Menieres disease. She was started on meclizine two times a day for dizziness. A fib was controlled with metoprolol and started on eliquis due to [id number] score of 6. She was recommended to follow up with cardiology and have outpatient stress test. She had diarrhea during stay after started on antibiotic and mag ox. This was negative for c diff and mag ox was discontinued with resolution of diarrhea. She was started on lisinopril for hypertensive urgency. She required replacement of potassium and magnesium during stay. She was treated for UTI with urine culture of E coli and had zosyn and later keflex. She had some mild hyperglycemia but no history of diabetes and hga1c was 5.4. She had nasal laceration and soft tissue facial trauma. She was discharged to TCU for further rehab. She has PMH of chronic shoulder, knee, ankle pain, asthma, allergic rhinitis, GERD. She had not seen a medical provider in 27 years. Prior to this she lived at home alone in apartment. She did not use an assistive device and  was independent for all cares.     Today Ms. Barnes states she has no pain. Her face looks worse than it is. It does hurt if she pushes on the hematoma on her forehead but otherwise it feels fine. She worked with OT this morning getting ready and did fine. OT says she is moving well and likes her 4 wheeled walker. She is getting around well and was able to dress herself on her own. She says she sleeps pretty good. She did have diarrhea last couple weeks but this is much improved now and not going nearly as much. Her appetite is good and eating very well. She denies any heartburn or nausea problems. She takes something for heartburn at home to prevent it and wants to make sure she is on something now. She cannot recall what she takes at home but knows it is different than what she is on now. She says she has no dizziness or headaches. She has no visual changes or trouble urinating. Her legs have not been swollen and she isn to having any cough or shortness of breath. She denies any numbness or tingling anywhere. She says she understands why she needs to be on the medications now and appreciates going over this in detail. She denies any other concerns with meds but is wondering if can come off the med for dizziness since she is not having issues. She will let the nurses know if she develops any issues with it. She is having some itching on feet at times and asking for hydrocortisone cream.     REVIEW OF SYSTEMS:  PROBLEMS AND REVIEW OF SYSTEMS:   Today on ROS:   Currently, no fever, chills, or rigors. Does not have any visual or hearing problems. Denies any chest pain, headaches, palpitations, lightheadedness, dizziness, shortness of breath, or cough. Appetite is good. Denies any GERD symptoms. Denies any difficulty with swallowing, nausea, or vomiting.  Denies any abdominal pain, diarrhea or constipation. Denies any urinary symptoms. No insomnia. No active bleeding. No rash. Positive for weakness, bruising on face,  no pain other than touching face, diarrhea resolved, ambulates with 4 wheeled walker      No Known Allergies  Current Outpatient Medications   Medication Sig     acetaminophen (TYLENOL) 500 MG tablet Take 1,000 mg by mouth 3 (three) times a day as needed for pain.     hydrocortisone 2.5 % cream Apply 1 application topically every 6 (six) hours as needed.     apixaban (ELIQUIS) 5 mg Tab tablet Take 1 tablet (5 mg total) by mouth 2 (two) times a day.     atorvastatin (LIPITOR) 40 MG tablet Take 1 tablet (40 mg total) by mouth daily.     calcium, as carbonate, (TUMS) 200 mg calcium (500 mg) chewable tablet Chew 1 tablet daily as needed for heartburn.     famotidine (PEPCID) 20 MG tablet Take 1 tablet (20 mg total) by mouth 2 (two) times a day.     levETIRAcetam (KEPPRA) 750 MG tablet Take 1 tablet (750 mg total) by mouth 2 (two) times a day.     lisinopril (PRINIVIL,ZESTRIL) 40 MG tablet Take 1 tablet (40 mg total) by mouth daily.     metoprolol succinate (TOPROL-XL) 200 MG 24 hr tablet Take 1 tablet (200 mg total) by mouth daily.     Past Medical History:    Past Medical History:   Diagnosis Date     Asthma            PHYSICAL EXAMINATION  Vitals:    05/30/19 1907   BP: 126/63   Pulse: 62   Resp: 18   Temp: 98.2  F (36.8  C)   SpO2: 96%   Weight: 136 lb (61.7 kg)       Today on physical exam:     GENERAL: Awake, Alert, oriented x3, not in any form of acute distress, answers questions appropriately, follows simple commands, conversant  HEENT: Head is normocephalic with normal hair distribution. No evidence of trauma. Ears: No acute purulent discharge. Eyes: Conjunctivae pink with no scleral jaundice. Nose: Normal mucosa and septum. NECK: Supple with no cervical or supraclavicular lymphadenopathy. Trachea is midline. Facial ecchymosis, small to mod size hematoma in middle of forehead, tender to palpation  CHEST: No tenderness or deformity, no crepitus  LUNG: dim to auscultation with good chest expansion. There are no  crackles or wheezes, normal AP diameter.  BACK: No kyphosis of the thoracic spine. Symmetric, no curvature, ROM normal, no CVA tenderness, no spinal tenderness   CVS: irregularly irregular rhythm, there are no murmurs, rubs, gallops, or heaves,  2+ pulses symmetric in all extremities.  ABDOMEN: Rounded and soft, nontender to palpation, non distended, no masses, no organomegaly, good bowel sounds, no rebound or guarding, no peritoneal signs.   EXTREMITIES: No pedal edema, Atraumatic. Full range of motion on both upper and lower extremities, there is no tenderness to palpation, no cyanosis or clubbing, no calf tenderness.  Pulses equal in all extremities, normal cap refill, no joint swelling.  SKIN: Warm and dry, no erythema noted.  Skin color, texture, no rashes or lesions. Left knee scabbed abrasion  NEUROLOGICAL: The patient is oriented to person, place and time. Strength and sensation are grossly intact. Face is symmetric.ambulates with 4 wheeled walker            LABS:   Recent Results (from the past 168 hour(s))   Magnesium   Result Value Ref Range    Magnesium 1.8 1.8 - 2.6 mg/dL   Potassium - Next AM   Result Value Ref Range    Potassium 3.8 3.5 - 5.0 mmol/L   Creatinine   Result Value Ref Range    Creatinine 0.63 0.60 - 1.10 mg/dL    GFR MDRD Af Amer >60 >60 mL/min/1.73m2    GFR MDRD Non Af Amer >60 >60 mL/min/1.73m2   HM2(CBC W/O DIFF)   Result Value Ref Range    WBC 9.8 4.0 - 11.0 thou/uL    RBC 3.84 3.80 - 5.40 mill/uL    Hemoglobin 11.9 (L) 12.0 - 16.0 g/dL    Hematocrit 36.0 35.0 - 47.0 %    MCV 94 80 - 100 fL    MCH 31.0 27.0 - 34.0 pg    MCHC 33.1 32.0 - 36.0 g/dL    RDW 12.4 11.0 - 14.5 %    Platelets 282 140 - 440 thou/uL    MPV 9.6 8.5 - 12.5 fL   Magnesium   Result Value Ref Range    Magnesium 1.7 (L) 1.8 - 2.6 mg/dL   Potassium - Next AM   Result Value Ref Range    Potassium 3.9 3.5 - 5.0 mmol/L   Basic Metabolic Panel   Result Value Ref Range    Sodium 139 136 - 145 mmol/L    Potassium 3.9 3.5 -  5.0 mmol/L    Chloride 111 (H) 98 - 107 mmol/L    CO2 19 (L) 22 - 31 mmol/L    Anion Gap, Calculation 9 5 - 18 mmol/L    Glucose 107 70 - 125 mg/dL    Calcium 9.6 8.5 - 10.5 mg/dL    BUN 17 8 - 22 mg/dL    Creatinine 0.59 (L) 0.60 - 1.10 mg/dL    GFR MDRD Af Amer >60 >60 mL/min/1.73m2    GFR MDRD Non Af Amer >60 >60 mL/min/1.73m2   Phosphorus   Result Value Ref Range    Phosphorus 4.2 2.5 - 4.5 mg/dL   Potassium - Next AM   Result Value Ref Range    Potassium 3.8 3.5 - 5.0 mmol/L   Magnesium   Result Value Ref Range    Magnesium 1.8 1.8 - 2.6 mg/dL   Magnesium   Result Value Ref Range    Magnesium 1.8 1.8 - 2.6 mg/dL   Basic Metabolic Panel   Result Value Ref Range    Sodium 139 136 - 145 mmol/L    Potassium 3.9 3.5 - 5.0 mmol/L    Chloride 107 98 - 107 mmol/L    CO2 22 22 - 31 mmol/L    Anion Gap, Calculation 10 5 - 18 mmol/L    Glucose 109 70 - 125 mg/dL    Calcium 10.0 8.5 - 10.5 mg/dL    BUN 16 8 - 22 mg/dL    Creatinine 0.68 0.60 - 1.10 mg/dL    GFR MDRD Af Amer >60 >60 mL/min/1.73m2    GFR MDRD Non Af Amer >60 >60 mL/min/1.73m2   POCT Glucose   Result Value Ref Range    Glucose 129 70 - 139 mg/dL   ECG 12 lead MUSE   Result Value Ref Range    SYSTOLIC BLOOD PRESSURE  mmHg    DIASTOLIC BLOOD PRESSURE  mmHg    VENTRICULAR RATE 58 BPM    ATRIAL RATE 58 BPM    P-R INTERVAL 146 ms    QRS DURATION 88 ms    Q-T INTERVAL 456 ms    QTC CALCULATION (BEZET) 447 ms    P Axis 52 degrees    R AXIS -9 degrees    T AXIS 135 degrees    MUSE DIAGNOSIS       Sinus bradycardia  Left ventricular hypertrophy with repolarization abnormality  Abnormal ECG  When compared with ECG of 20-MAY-2019 22:26,  Vent. rate has decreased BY  39 BPM  QT has shortened  Confirmed by TYRON AGEE MD LOC:[facility] ([id number]) on 5/28/2019 9:30:59 AM     Basic Metabolic Panel   Result Value Ref Range    Sodium 139 136 - 145 mmol/L    Potassium 3.7 3.5 - 5.0 mmol/L    Chloride 108 (H) 98 - 107 mmol/L    CO2 21 (L) 22 - 31 mmol/L    Anion Gap, Calculation 10 5 -  18 mmol/L    Glucose 134 (H) 70 - 125 mg/dL    Calcium 9.9 8.5 - 10.5 mg/dL    BUN 18 8 - 22 mg/dL    Creatinine 0.73 0.60 - 1.10 mg/dL    GFR MDRD Af Amer >60 >60 mL/min/1.73m2    GFR MDRD Non Af Amer >60 >60 mL/min/1.73m2   HM2(CBC W/O DIFF)   Result Value Ref Range    WBC 12.9 (H) 4.0 - 11.0 thou/uL    RBC 3.86 3.80 - 5.40 mill/uL    Hemoglobin 12.1 12.0 - 16.0 g/dL    Hematocrit 36.4 35.0 - 47.0 %    MCV 94 80 - 100 fL    MCH 31.3 27.0 - 34.0 pg    MCHC 33.2 32.0 - 36.0 g/dL    RDW 12.1 11.0 - 14.5 %    Platelets 396 140 - 440 thou/uL    MPV 10.3 8.5 - 12.5 fL   Troponin I   Result Value Ref Range    Troponin I 0.01 0.00 - 0.29 ng/mL   Levetiracetam [Keppra ]   Result Value Ref Range    Levetiracetam 17.7 6.0 - 46.0 ug/mL   Prolactin   Result Value Ref Range    Prolactin 51.7 (H) 0.0 - 20.0 ng/mL   Levetiracetam [Keppra ]   Result Value Ref Range    Levetiracetam 31.5 6.0 - 46.0 ug/mL   HM1 (CBC with Diff)   Result Value Ref Range    WBC 11.6 (H) 4.0 - 11.0 thou/uL    RBC 3.61 (L) 3.80 - 5.40 mill/uL    Hemoglobin 11.3 (L) 12.0 - 16.0 g/dL    Hematocrit 33.9 (L) 35.0 - 47.0 %    MCV 94 80 - 100 fL    MCH 31.3 27.0 - 34.0 pg    MCHC 33.3 32.0 - 36.0 g/dL    RDW 12.0 11.0 - 14.5 %    Platelets 356 140 - 440 thou/uL    MPV 9.9 8.5 - 12.5 fL    Neutrophils % 68 50 - 70 %    Lymphocytes % 18 (L) 20 - 40 %    Monocytes % 12 (H) 2 - 10 %    Eosinophils % 2 0 - 6 %    Basophils % 0 0 - 2 %    Neutrophils Absolute 7.8 (H) 2.0 - 7.7 thou/uL    Lymphocytes Absolute 2.1 0.8 - 4.4 thou/uL    Monocytes Absolute 1.4 (H) 0.0 - 0.9 thou/uL    Eosinophils Absolute 0.2 0.0 - 0.4 thou/uL    Basophils Absolute 0.0 0.0 - 0.2 thou/uL     Results for orders placed or performed during the hospital encounter of 05/20/19   Basic Metabolic Panel   Result Value Ref Range    Sodium 139 136 - 145 mmol/L    Potassium 3.7 3.5 - 5.0 mmol/L    Chloride 108 (H) 98 - 107 mmol/L    CO2 21 (L) 22 - 31 mmol/L    Anion Gap, Calculation 10 5 - 18 mmol/L     Glucose 134 (H) 70 - 125 mg/dL    Calcium 9.9 8.5 - 10.5 mg/dL    BUN 18 8 - 22 mg/dL    Creatinine 0.73 0.60 - 1.10 mg/dL    GFR MDRD Af Amer >60 >60 mL/min/1.73m2    GFR MDRD Non Af Amer >60 >60 mL/min/1.73m2         Lab Results   Component Value Date    WBC 11.6 (H) 05/29/2019    HGB 11.3 (L) 05/29/2019    HCT 33.9 (L) 05/29/2019    MCV 94 05/29/2019     05/29/2019       No results found for: [id number]  Lab Results   Component Value Date    HGBA1C 5.4 05/21/2019     Lab Results   Component Value Date    INR 0.99 05/20/2019     No results found for: [id number]  Lab Results   Component Value Date    TSH 1.00 05/20/2019           ASSESSMENT/PLAN:    1. Atrial fibrillation: Rate controlled in 60s. No chest pain, palpitations, heart racing. On metoprolol and eliquis. F/u cardiology.   2. Complex partial seizures: On keppra. No recent seizure activity or unresponsive episodes. F/u neurology in 6-8 weeks. Seizures precautions.   3. Subacute left putamen CVA: On eliquis, atorvastatin. No residual deficits. F/u neurology.   4. Vertigo: referred to ENT for workup outpatient for vestibulopathy and family h/o Menieres disease. However no recent dizziness and trialing off meclizine.   5. HTN: SBP 120s. On lisinopril, metoprolol.   6. HLD: On atorvastatin.   7. GERD: On pepcid two times a day. tums prn.   8. Pruritus: On feet, intermittently. Requesting Hydrocortisone cream prn.   9. Fall, facial trauma: Soft tissue, ct negative for acute fracture, dislocation. Significant ecchymosis, hematoma in forehead. Improving. Ice prn. Tylenol prn pain.     Bmp, hm1 on 6/3, keppra level 6/3    Per therapy eval today      Electronically signed by: Lynette Almaguer NP    Total floor/unit time spent 35 with 25 time spent on counseling and coordination of care. Counseling was done regarding med changes, hospital course, pain management, a fib management, hypertension management, cva management. Counseling regarding need for  specialty care follow up and lab monitoring. coordinated care with nursing and therapy for management of htn, seizures, cva, dizziness, lab monitoring, specialty care follow up, need for skilled therapy.

## 2021-05-29 NOTE — PROGRESS NOTES
[facility]    DATE: 2019    NAME:  Galina Barnes             :  1950  MRN: [id number]  CODE STATUS:  FULL CODE    VISIT TYPE: Discharge Summary     FACILITY:  [facility] [[id number]]       CHIEF COMPLAIN/REASON FOR VISIT:    Chief Complaint   Patient presents with     Discharge Summary               HISTORY OF PRESENT ILLNESS: Galina Barnes is a 68 y.o. female who was admitted - for hypertensive urgency, A fib RVR, subacute Left putamen infarct. She had vertigo and fall with facial trauma at home. EEG suggested complex partial seizures. She was started on keppra and dose increased after another possible seizure/unresponsive episode on . Neurology Dr. Saravia followed during stay. She was recommended to f/u with neurology in 6-8 weeks. She was referred to ENT as outpatient for vestibulopathy and family history of Menieres disease. She was started on meclizine two times a day for dizziness. A fib was controlled with metoprolol and started on eliquis due to [id number] score of 6. She was recommended to follow up with cardiology and have outpatient stress test. She had diarrhea during stay after started on antibiotic and mag ox. This was negative for c diff and mag ox was discontinued with resolution of diarrhea. She was started on lisinopril for hypertensive urgency. She required replacement of potassium and magnesium during stay. She was treated for UTI with urine culture of E coli and had zosyn and later keflex. She had some mild hyperglycemia but no history of diabetes and hga1c was 5.4. She had nasal laceration and soft tissue facial trauma. She was discharged to TCU for further rehab. She has PMH of chronic shoulder, knee, ankle pain, asthma, allergic rhinitis, GERD. She had not seen a medical provider in 27 years. Prior to this she lived at home alone in apartment. She did not use an assistive device and was independent for all  cares.     TCU course:   Ms. Barnes has made progress with therapy and is ambulating 300 feet with walker and is independent for ADLs. She scored 16 on slums, 20/28 on tinnetti, 40/46 on soto. She remains high fall risk. Insurance issued last covered day of 6/3 so will be discharging on 6/4. During her short stay her vitals were controlled and keppra level was stable 31.5. She has an appt with ENT on 6/7 and cardiology on 7/17. She was not having any dizziness and meclizine stopped. She did not have any recurrence. Her facial ecchymosis is improving. She will be returning home 6/4 with  PT, Ot, HHA, RN, ST. Speech therapy will be following for cognitive impairment. She will f/u with new PCP in 5-7 days.     REVIEW OF SYSTEMS:  PROBLEMS AND REVIEW OF SYSTEMS:   Today on ROS:   Currently, no fever, chills, or rigors. Does not have any visual or hearing problems. Denies any chest pain, headaches, palpitations, lightheadedness, dizziness, shortness of breath, or cough. Appetite is good. Denies any GERD symptoms. Denies any difficulty with swallowing, nausea, or vomiting.  Denies any abdominal pain, diarrhea or constipation. Denies any urinary symptoms. No insomnia. No active bleeding. No rash. Positive for bruising on face, ambulates with 4 wheeled walker      No Known Allergies  Current Outpatient Medications   Medication Sig     acetaminophen (TYLENOL) 500 MG tablet Take 1,000 mg by mouth 3 (three) times a day as needed for pain.     apixaban (ELIQUIS) 5 mg Tab tablet Take 1 tablet (5 mg total) by mouth 2 (two) times a day.     atorvastatin (LIPITOR) 40 MG tablet Take 1 tablet (40 mg total) by mouth daily.     calcium, as carbonate, (TUMS) 200 mg calcium (500 mg) chewable tablet Chew 1 tablet daily as needed for heartburn.     famotidine (PEPCID) 20 MG tablet Take 1 tablet (20 mg total) by mouth 2 (two) times a day.     hydrocortisone 2.5 % cream Apply 1 application topically every 6 (six) hours as needed.      levETIRAcetam (KEPPRA) 750 MG tablet Take 1 tablet (750 mg total) by mouth 2 (two) times a day.     lisinopril (PRINIVIL,ZESTRIL) 40 MG tablet Take 1 tablet (40 mg total) by mouth daily.     metoprolol succinate (TOPROL-XL) 200 MG 24 hr tablet Take 1 tablet (200 mg total) by mouth daily.     Past Medical History:    Past Medical History:   Diagnosis Date     Asthma            PHYSICAL EXAMINATION  Vitals:    06/03/19 2141   BP: 150/72   Pulse: 76   Resp: 16   Temp: 97  F (36.1  C)   SpO2: 97%   Weight: 134 lb (60.8 kg)       Today on physical exam:     GENERAL: Awake, Alert, oriented x3, not in any form of acute distress, answers questions appropriately, follows simple commands, conversant  HEENT: Head is normocephalic with normal hair distribution. No evidence of trauma. Ears: No acute purulent discharge. Eyes: Conjunctivae pink with no scleral jaundice. Nose: Normal mucosa and septum. NECK: Supple with no cervical or supraclavicular lymphadenopathy. Trachea is midline. Facial ecchymosis, small to mod size hematoma in middle of forehead, tender to palpation but improving  CHEST: No tenderness or deformity, no crepitus  LUNG: dim to auscultation with good chest expansion. There are no crackles or wheezes, normal AP diameter.  BACK: No kyphosis of the thoracic spine. Symmetric, no curvature, ROM normal, no CVA tenderness, no spinal tenderness   CVS: irregularly irregular rhythm, there are no murmurs, rubs, gallops, or heaves,  2+ pulses symmetric in all extremities.  ABDOMEN: Rounded and soft, nontender to palpation, non distended, no masses, no organomegaly, good bowel sounds, no rebound or guarding, no peritoneal signs.   EXTREMITIES: No pedal edema, Atraumatic. Full range of motion on both upper and lower extremities, there is no tenderness to palpation, no cyanosis or clubbing, no calf tenderness.  Pulses equal in all extremities, normal cap refill, no joint swelling.  SKIN: Warm and dry, no erythema noted.  Skin  color, texture, no rashes or lesions.   NEUROLOGICAL: The patient is oriented to person, place and time. Strength and sensation are grossly intact. Face is symmetric.ambulates with 4 wheeled walker            LABS:   Recent Results (from the past 168 hour(s))   Levetiracetam [Keppra ]   Result Value Ref Range    Levetiracetam 31.5 6.0 - 46.0 ug/mL   HM1 (CBC with Diff)   Result Value Ref Range    WBC 11.6 (H) 4.0 - 11.0 thou/uL    RBC 3.61 (L) 3.80 - 5.40 mill/uL    Hemoglobin 11.3 (L) 12.0 - 16.0 g/dL    Hematocrit 33.9 (L) 35.0 - 47.0 %    MCV 94 80 - 100 fL    MCH 31.3 27.0 - 34.0 pg    MCHC 33.3 32.0 - 36.0 g/dL    RDW 12.0 11.0 - 14.5 %    Platelets 356 140 - 440 thou/uL    MPV 9.9 8.5 - 12.5 fL    Neutrophils % 68 50 - 70 %    Lymphocytes % 18 (L) 20 - 40 %    Monocytes % 12 (H) 2 - 10 %    Eosinophils % 2 0 - 6 %    Basophils % 0 0 - 2 %    Neutrophils Absolute 7.8 (H) 2.0 - 7.7 thou/uL    Lymphocytes Absolute 2.1 0.8 - 4.4 thou/uL    Monocytes Absolute 1.4 (H) 0.0 - 0.9 thou/uL    Eosinophils Absolute 0.2 0.0 - 0.4 thou/uL    Basophils Absolute 0.0 0.0 - 0.2 thou/uL   Basic Metabolic Panel   Result Value Ref Range    Sodium 143 136 - 145 mmol/L    Potassium 3.6 3.5 - 5.0 mmol/L    Chloride 111 (H) 98 - 107 mmol/L    CO2 23 22 - 31 mmol/L    Anion Gap, Calculation 9 5 - 18 mmol/L    Glucose 94 70 - 125 mg/dL    Calcium 10.0 8.5 - 10.5 mg/dL    BUN 16 8 - 22 mg/dL    Creatinine 0.68 0.60 - 1.10 mg/dL    GFR MDRD Af Amer >60 >60 mL/min/1.73m2    GFR MDRD Non Af Amer >60 >60 mL/min/1.73m2   Levetiracetam [Keppra ]   Result Value Ref Range    Levetiracetam 25.5 6.0 - 46.0 ug/mL   HM1 (CBC with Diff)   Result Value Ref Range    WBC 9.7 4.0 - 11.0 thou/uL    RBC 3.46 (L) 3.80 - 5.40 mill/uL    Hemoglobin 10.8 (L) 12.0 - 16.0 g/dL    Hematocrit 32.7 (L) 35.0 - 47.0 %    MCV 95 80 - 100 fL    MCH 31.2 27.0 - 34.0 pg    MCHC 33.0 32.0 - 36.0 g/dL    RDW 11.9 11.0 - 14.5 %    Platelets 455 (H) 140 - 440 thou/uL    MPV  10.6 8.5 - 12.5 fL    Neutrophils % 67 50 - 70 %    Lymphocytes % 20 20 - 40 %    Monocytes % 8 2 - 10 %    Eosinophils % 5 0 - 6 %    Basophils % 0 0 - 2 %    Neutrophils Absolute 6.4 2.0 - 7.7 thou/uL    Lymphocytes Absolute 2.0 0.8 - 4.4 thou/uL    Monocytes Absolute 0.8 0.0 - 0.9 thou/uL    Eosinophils Absolute 0.4 0.0 - 0.4 thou/uL    Basophils Absolute 0.0 0.0 - 0.2 thou/uL     Results for orders placed or performed in visit on 06/03/19   Basic Metabolic Panel   Result Value Ref Range    Sodium 143 136 - 145 mmol/L    Potassium 3.6 3.5 - 5.0 mmol/L    Chloride 111 (H) 98 - 107 mmol/L    CO2 23 22 - 31 mmol/L    Anion Gap, Calculation 9 5 - 18 mmol/L    Glucose 94 70 - 125 mg/dL    Calcium 10.0 8.5 - 10.5 mg/dL    BUN 16 8 - 22 mg/dL    Creatinine 0.68 0.60 - 1.10 mg/dL    GFR MDRD Af Amer >60 >60 mL/min/1.73m2    GFR MDRD Non Af Amer >60 >60 mL/min/1.73m2         Lab Results   Component Value Date    WBC 9.7 06/03/2019    HGB 10.8 (L) 06/03/2019    HCT 32.7 (L) 06/03/2019    MCV 95 06/03/2019     (H) 06/03/2019       No results found for: [id number]  Lab Results   Component Value Date    HGBA1C 5.4 05/21/2019     Lab Results   Component Value Date    INR 0.99 05/20/2019     No results found for: [id number]  Lab Results   Component Value Date    TSH 1.00 05/20/2019           ASSESSMENT/PLAN:    1. Atrial fibrillation: Rate controlled in 70s. No chest pain, palpitations, heart racing. On metoprolol and eliquis. F/u cardiology.   2. Complex partial seizures: On keppra. No recent seizure activity or unresponsive episodes. F/u neurology in 6-8 weeks. Seizures precautions. Keppra level 31.5 on 5/29.   3. Subacute left putamen CVA: On eliquis, atorvastatin. No residual deficits. F/u neurology.   4. Vertigo: referred to ENT for workup outpatient for vestibulopathy and family h/o Menieres disease. Off meclizine. ENT 6/7.   5. HTN: SBP 150s. On lisinopril, metoprolol.   6. HLD: On atorvastatin.   7. GERD: On  pepcid two times a day. tums prn.   8. Pruritus: On feet, intermittently. Requesting Hydrocortisone cream prn.   9. Fall, facial trauma: Soft tissue, ct negative for acute fracture, dislocation. Significant ecchymosis, hematoma in forehead. Improving. Ice prn. Tylenol prn pain.       Electronically signed by: Lynette Almaguer NP    Total floor/unit time 35 min with 25 min spent on counseling and coordination of care. Counseling regarding med changes during tcu stay, seizure management, lab results, cva management, need for further specialty care follow up. Counseling regarding primary care management. Coordinated care with nursing, therapy,  for discharge planning, med orders for discharge, specialty care follow up. Educated on importance of med compliance and medical follow up.     Please evaluate Galina Barnes for admission to Home Health.    Face to Face Attestation and Initial Plan of Care    The face-to-face encounter occurred on date: 6/4/19  Face to Face encounter was with: Lynette Almaguer    Please provide brief clinical summary of reason for visit and need for home care. Deconditioning after hospital stay for a fib, seizures, cva, vertigo    Please identify which of the following home health disciplines the patient will need AND describe the skilled services that you would like the home health agency to perform: SKILLED NURSING (RN): perform/teach anticoagulation and complex med management, PHYSICAL THERAPY: strength training and gait training, OCCUPATIONAL THERAPY: ADLs and home safety, SPEECH LANGUAGE PATHOLOGY:other cognitive impairment and HOME HEALTH AIDE    Homebound Status (describe the functional limitations that support this patient is confined to his/her home. Medicaid recipients are not required to be homebound.):assistive device needed:  2WW    Name of physician who will be responsible for the ongoing home health plan of care (CMS requires the referring physician to provide the  "specific name of the community physician instead of a title, such as \"PCP\"): Jose Bryant MD    Requested Start of Care Date: Within 48 hours    Other information to assist the home health agency in developing the initial Plan of Care:    I certify that services are/were furnished while this patient was under the care of a physician and that a physician or an allowed non-physician practitioner (NPP), had a face-to-face encounter that meets the physician face-to-face encounter requirements. The encounter was in whole, or in part, related to the primary reason for home health. The patient is confined to his/her home and needs intermittent skilled nursing, physical therapy, speech-language pathology, or the continued need for occupational therapy. A plan of care has been established by a physician and is periodically reviewed by a physician.                       "

## 2021-05-29 NOTE — TELEPHONE ENCOUNTER
[facility] Nurse Triage Telephone Note      Provider: TIFFANI Yanez  Facility: [facility]    Facility Type: TCU    Caller: Aisha  Call Back Number:  [phone]    Allergies: Patient has no known allergies.    Reason for call: BMP WNL, CBC and Keppra level stable     Verbal Order/Direction given by Provider: ELLEN    Provider giving order: TIFFANI Yanez    Verbal order given to: Aisha Thomas RN

## 2021-05-29 NOTE — TELEPHONE ENCOUNTER
06.04 Left message for pt to reschedule to another day due to provider not being able to see this type of appt on the scheduled date (vertigo)

## 2021-05-30 NOTE — PROGRESS NOTES
Galina Barnes is a 68 y.o. female seen in consultation at the request of Dr. Bryant for vertigo.  Onset: years ago, but forgot the exact time it started   Episodic vs Chronic: episodic  Length of episodes: 3 hrs to 3 days  Description of episodes: feels sudden onset with room-spinning dizziness and vomiting.  Last episode: 5/20/19  Frequency of episodes: 1-2 per year  Positional: no  Change in hearing with episodes:  no  Otologic history of infections or surgery: none  History of headaches: yes  Headaches with episodes: yes  History of head trauma: yes, hx of CVA  Previous evaluations: none    ALLERGY:  No Known Allergies    MEDICATIONS:     Current Outpatient Medications on File Prior to Visit   Medication Sig Dispense Refill     acetaminophen (TYLENOL) 500 MG tablet Take 1,000 mg by mouth 3 (three) times a day as needed for pain.       atorvastatin (LIPITOR) 40 MG tablet Take 1 tablet (40 mg total) by mouth daily. 30 tablet 0     calcium, as carbonate, (TUMS) 200 mg calcium (500 mg) chewable tablet Chew 1 tablet daily as needed for heartburn.       famotidine (PEPCID) 20 MG tablet Take 1 tablet (20 mg total) by mouth 2 (two) times a day. 60 tablet 0     hydrocortisone 2.5 % cream Apply 1 application topically every 6 (six) hours as needed.       levETIRAcetam (KEPPRA) 750 MG tablet Take 1 tablet (750 mg total) by mouth 2 (two) times a day. 60 tablet 0     lisinopril (PRINIVIL,ZESTRIL) 40 MG tablet Take 1 tablet (40 mg total) by mouth daily. 30 tablet 0     metoprolol succinate (TOPROL-XL) 200 MG 24 hr tablet Take 1 tablet (200 mg total) by mouth daily. 30 tablet 0     rivaroxaban (XARELTO) 20 mg tablet Take 20 mg by mouth daily.       No current facility-administered medications on file prior to visit.        Past Medical/Surgical History, Family History and Social History reviewed in detail and documented separately in the medical record.    Complete Review of Systems:  A 10-point review was performed.  Pertinent  "positives are noted in the HPI and on a separate scanned document in the chart.    EXAM:  There were no vitals filed for this visit.    Nurse documentation reviewed  and documented separately.    General Appearance: Pleasant, alert, appropriate appearance for age. No acute distress    Head Exam: Normal. Normocephalic, atraumatic.    Eye Exam: Normal external eye, conjunctiva, lids, cornea. Extra-ocular movements are intact.    Left external ear: normal  Left otoscopic exam: Normal EAC. Normal TM     Right external ear: normal  Right otoscopic exam: Normal EAC. Normal TM    Nose Exam: Normal external nose. Septum midline. Nasal mucosa normal.  Inferior turbinates normal.    OroPharynx Exam: Dental hygiene adequate. Normal tongue. Normal buccal mucosa. Normal palate.  Normal pharynx. Normal tonsils.    Neck Exam: Supple, no masses or nodes. Trachea and larynx midline.    Thyroid Exam: No tenderness, nodules or enlargement.    Salivary Glands: nontender without masses    Neuro: Alert and oriented times 3, CN 2-12 grossly intact, no nystagmus, PERRL, EOMI, normal speech and gait    Chest/Respiratory Exam: Normal chest wall motion and respiratory effort. No audible stridor or wheezing.    Cardiovascular Exam: Regular rate and rhythm.  No cyanosis, clubbing or edema.    Pulses: carotid pulses normal    Vestibular:  Gait is normal, tandem gait is normal, Romberg is normal, Yandy-Hallpike is normal, Finger-nose-finger is normal, Fukuda is normal    ASSESSMENT:  1. Dizziness        PLAN: Findings, assessment, and management options were discussed.   SHe has episodes of vertigo that last 3 hours to 3 days without fluctuation in her hearing.  She notes history of migraines.  Rarely headache at the same time but she says \"they feel the same\".  Will get VNG to explore any peripheral vestibular component.  If negative consider Neurology for consideratio f migrainous vertigo.        "

## 2021-05-30 NOTE — PATIENT INSTRUCTIONS - HE
Galina Barnes,    It was a pleasure to see you today at the [facility].     My recommendations after this visit include:    Continue current medications.  Take metoprolol in the afternoon to see if you are less tired    Gradually increase activity    Check pulse daily and with symptoms.  Note if fast/irregular (A fib).  How do you feel?  Keep a log of A fib episode.  Call if you have frequent or prolonged symptomatic episodes of A fib.    Follow up with Dr. Bolaños  Follow up with me in 6 months.    Callie Neal, [facility], Electrophysiology  [phone]   nurses [phone]

## 2021-06-03 VITALS
RESPIRATION RATE: 12 BRPM | DIASTOLIC BLOOD PRESSURE: 78 MMHG | BODY MASS INDEX: 25.87 KG/M2 | HEIGHT: 63 IN | HEART RATE: 59 BPM | WEIGHT: 146 LBS | SYSTOLIC BLOOD PRESSURE: 154 MMHG

## 2021-06-03 VITALS — WEIGHT: 134 LBS | BODY MASS INDEX: 23.74 KG/M2

## 2021-06-03 VITALS — WEIGHT: 136 LBS | BODY MASS INDEX: 24.09 KG/M2

## 2021-06-03 VITALS — BODY MASS INDEX: 25 KG/M2 | HEIGHT: 63 IN | WEIGHT: 141.1 LBS

## 2021-06-04 VITALS — HEIGHT: 63 IN | BODY MASS INDEX: 26.95 KG/M2 | WEIGHT: 152.1 LBS

## 2021-06-04 NOTE — ANESTHESIA PROCEDURE NOTES
Peripheral Block    Patient location during procedure: pre-op  Start time: 12/4/2019 10:58 AM  End time: 12/4/2019 11:04 AM  post-op analgesia per surgeon order as noted in medical record  Staffing:  Performing  Anesthesiologist: Néstor Cheung MD  Preanesthetic Checklist  Completed: patient identified, site marked, risks, benefits, and alternatives discussed, timeout performed, consent obtained, airway assessed, oxygen available, suction available, emergency drugs available and hand hygiene performed  Peripheral Block  Block type: saphenous, adductor canal block  Prep: ChloraPrep  Patient position: supine  Patient monitoring: cardiac monitor, continuous pulse oximetry, heart rate and blood pressure  Laterality: left  Injection technique: ultrasound guided    Ultrasound used to visualize needle placement in proximity to nerve being blocked: yes   US used to visualize anesthetic spread  Visualized anatomic structures normal  No Pathological Findings  Permanent ultrasound image captured for medical record    Needle  Needle type: Stimuplex   Needle gauge: 20G  Needle length: 6 in    Assessment  Injection assessment: no difficulty with injection, negative aspiration for heme, no paresthesia on injection and incremental injection

## 2021-06-04 NOTE — ANESTHESIA CARE TRANSFER NOTE
Last vitals:   Vitals:    12/04/19 1400   BP: 113/56   Pulse: 76   Resp: 9   Temp: 97.2 f   SpO2: 100%     Patient's level of consciousness is drowsy  Spontaneous respirations: yes  Maintains airway independently: yes  Dentition unchanged: yes  Oropharynx: oropharynx clear of all foreign objects    QCDR Measures:  ASA# 20 - Surgical Safety Checklist: WHO surgical safety checklist completed prior to induction    PQRS# 430 - Adult PONV Prevention: 4558F - Pt received => 2 anti-emetic agents (different classes) preop & intraop  ASA# 8 - Peds PONV Prevention: NA - Not pediatric patient, not GA or 2 or more risk factors NOT present  PQRS# 424 - Pallavi-op Temp Management: 4559F - At least one body temp DOCUMENTED => 35.5C or 95.9F within required timeframe  PQRS# 426 - PACU Transfer Protocol: - Transfer of care checklist used  ASA# 14 - Acute Post-op Pain: ASA14B - Patient did NOT experience pain >= 7 out of 10

## 2021-06-04 NOTE — ANESTHESIA POSTPROCEDURE EVALUATION
Patient: Galina Barnes  RIGHT TOTAL KNEE ARTHROPLASTY  Anesthesia type: spinal    Patient location: PACU  Last vitals:   Vitals Value Taken Time   /81 12/4/2019  3:05 PM   Temp 36.4  C (97.5  F) 12/4/2019  3:05 PM   Pulse 80 12/4/2019  3:05 PM   Resp 18 12/4/2019  3:05 PM   SpO2 97 % 12/4/2019  3:05 PM     Post vital signs: stable  Level of consciousness: awake and responds to simple questions  Post-anesthesia pain: pain controlled  Post-anesthesia nausea and vomiting: no  Pulmonary: unassisted, return to baseline  Cardiovascular: stable and blood pressure at baseline  Hydration: adequate  Anesthetic events: no    QCDR Measures:  ASA# 11 - Pallavi-op Cardiac Arrest: ASA11B - Patient did NOT experience unanticipated cardiac arrest  ASA# 12 - Pallavi-op Mortality Rate: ASA12B - Patient did NOT die  ASA# 13 - PACU Re-Intubation Rate: ASA13B - Patient did NOT require a new airway mgmt  ASA# 10 - Composite Anes Safety: ASA10A - No serious adverse event    Additional Notes:

## 2021-06-04 NOTE — ANESTHESIA PROCEDURE NOTES
Spinal Block    Patient location during procedure: OR  Start time: 12/4/2019 12:18 PM  End time: 12/4/2019 12:23 PM  Reason for block: primary anesthetic    Staffing:  Performing  Anesthesiologist: Néstor Cheung MD    Preanesthetic Checklist  Completed: patient identified, risks, benefits, and alternatives discussed, timeout performed, consent obtained, airway assessed, oxygen available, suction available, emergency drugs available and hand hygiene performed  Spinal Block  Patient position: sitting  Prep: ChloraPrep  Patient monitoring: heart rate, cardiac monitor, continuous pulse ox and blood pressure  Approach: left paramedian  Location: L3-4  Injection technique: single-shot  Needle type: pencil-tip   Needle gauge: 24 G

## 2021-06-04 NOTE — ANESTHESIA PREPROCEDURE EVALUATION
Anesthesia Evaluation      Patient summary reviewed   No history of anesthetic complications     Airway   Mallampati: II  Neck ROM: full   Pulmonary - negative ROS and normal exam   (+) asthma                           Cardiovascular - negative ROS and normal exam  (+) hypertension, ,      Neuro/Psych - negative ROS     Endo/Other - negative ROS      GI/Hepatic/Renal - negative ROS   (+) GERD,             Dental    (+) chipped                       Anesthesia Plan  Planned anesthetic: spinal and peripheral nerve block    ASA 2     Anesthetic plan and risks discussed with: patient and child/children    Post-op plan: routine recovery

## 2021-06-09 NOTE — PATIENT INSTRUCTIONS - HE
Galina Barnes,    It was a pleasure to see you today at the [facility].     My recommendations after this visit include:    Continue current medications.    Call if you have frequent or prolonged episodes of A fib    Follow up in 1 year, or sooner if needed    Callie Neal, CNP  [facility], Electrophysiology  [phone]  EP nurses [phone]

## 2021-06-19 NOTE — LETTER
Letter by Lynette Almaguer NP at      Author: Lynette Almaguer NP Service: -- Author Type: --    Filed:  Encounter Date: 2019 Status: (Other)         Patient: Galina Barnes   MR Number: [id number]   YOB: 1950   Date of Visit: 2019                 [facility]    DATE: 2019    NAME:  Galina Barnes             :  1950  MRN: [id number]  CODE STATUS:  FULL CODE    VISIT TYPE: Problem Visit (hospital f/u)     FACILITY:  [facility] [[id number]]       CHIEF COMPLAIN/REASON FOR VISIT:    Chief Complaint   Patient presents with   ? Problem Visit     hospital f/u               HISTORY OF PRESENT ILLNESS: Galina Barnes is a 68 y.o. female who was admitted - for hypertensive urgency, A fib RVR, subacute Left putamen infarct. She had vertigo and fall with facial trauma at home. EEG suggested complex partial seizures. She was started on keppra and dose increased after another possible seizure/unresponsive episode on . Neurology Dr. Saravia followed during stay. She was recommended to f/u with neurology in 6-8 weeks. She was referred to ENT as outpatient for vestibulopathy and family history of Menieres disease. She was started on meclizine two times a day for dizziness. A fib was controlled with metoprolol and started on eliquis due to [id number] score of 6. She was recommended to follow up with cardiology and have outpatient stress test. She had diarrhea during stay after started on antibiotic and mag ox. This was negative for c diff and mag ox was discontinued with resolution of diarrhea. She was started on lisinopril for hypertensive urgency. She required replacement of potassium and magnesium during stay. She was treated for UTI with urine culture of E coli and had zosyn and later keflex. She had some mild hyperglycemia but no history of diabetes and hga1c was 5.4. She had nasal laceration and soft tissue facial trauma. She was  discharged to TCU for further rehab. She has PMH of chronic shoulder, knee, ankle pain, asthma, allergic rhinitis, GERD. She had not seen a medical provider in 27 years. Prior to this she lived at home alone in apartment. She did not use an assistive device and was independent for all cares.     Today Ms. Barnes states she has no pain. Her face looks worse than it is. It does hurt if she pushes on the hematoma on her forehead but otherwise it feels fine. She worked with OT this morning getting ready and did fine. OT says she is moving well and likes her 4 wheeled walker. She is getting around well and was able to dress herself on her own. She says she sleeps pretty good. She did have diarrhea last couple weeks but this is much improved now and not going nearly as much. Her appetite is good and eating very well. She denies any heartburn or nausea problems. She takes something for heartburn at home to prevent it and wants to make sure she is on something now. She cannot recall what she takes at home but knows it is different than what she is on now. She says she has no dizziness or headaches. She has no visual changes or trouble urinating. Her legs have not been swollen and she isn to having any cough or shortness of breath. She denies any numbness or tingling anywhere. She says she understands why she needs to be on the medications now and appreciates going over this in detail. She denies any other concerns with meds but is wondering if can come off the med for dizziness since she is not having issues. She will let the nurses know if she develops any issues with it. She is having some itching on feet at times and asking for hydrocortisone cream.     REVIEW OF SYSTEMS:  PROBLEMS AND REVIEW OF SYSTEMS:   Today on ROS:   Currently, no fever, chills, or rigors. Does not have any visual or hearing problems. Denies any chest pain, headaches, palpitations, lightheadedness, dizziness, shortness of breath, or cough. Appetite  is good. Denies any GERD symptoms. Denies any difficulty with swallowing, nausea, or vomiting.  Denies any abdominal pain, diarrhea or constipation. Denies any urinary symptoms. No insomnia. No active bleeding. No rash. Positive for weakness, bruising on face, no pain other than touching face, diarrhea resolved, ambulates with 4 wheeled walker      No Known Allergies  Current Outpatient Medications   Medication Sig   ? acetaminophen (TYLENOL) 500 MG tablet Take 1,000 mg by mouth 3 (three) times a day as needed for pain.   ? hydrocortisone 2.5 % cream Apply 1 application topically every 6 (six) hours as needed.   ? apixaban (ELIQUIS) 5 mg Tab tablet Take 1 tablet (5 mg total) by mouth 2 (two) times a day.   ? atorvastatin (LIPITOR) 40 MG tablet Take 1 tablet (40 mg total) by mouth daily.   ? calcium, as carbonate, (TUMS) 200 mg calcium (500 mg) chewable tablet Chew 1 tablet daily as needed for heartburn.   ? famotidine (PEPCID) 20 MG tablet Take 1 tablet (20 mg total) by mouth 2 (two) times a day.   ? levETIRAcetam (KEPPRA) 750 MG tablet Take 1 tablet (750 mg total) by mouth 2 (two) times a day.   ? lisinopril (PRINIVIL,ZESTRIL) 40 MG tablet Take 1 tablet (40 mg total) by mouth daily.   ? metoprolol succinate (TOPROL-XL) 200 MG 24 hr tablet Take 1 tablet (200 mg total) by mouth daily.     Past Medical History:    Past Medical History:   Diagnosis Date   ? Asthma            PHYSICAL EXAMINATION  Vitals:    05/30/19 1907   BP: 126/63   Pulse: 62   Resp: 18   Temp: 98.2  F (36.8  C)   SpO2: 96%   Weight: 136 lb (61.7 kg)       Today on physical exam:     GENERAL: Awake, Alert, oriented x3, not in any form of acute distress, answers questions appropriately, follows simple commands, conversant  HEENT: Head is normocephalic with normal hair distribution. No evidence of trauma. Ears: No acute purulent discharge. Eyes: Conjunctivae pink with no scleral jaundice. Nose: Normal mucosa and septum. NECK: Supple with no cervical  or supraclavicular lymphadenopathy. Trachea is midline. Facial ecchymosis, small to mod size hematoma in middle of forehead, tender to palpation  CHEST: No tenderness or deformity, no crepitus  LUNG: dim to auscultation with good chest expansion. There are no crackles or wheezes, normal AP diameter.  BACK: No kyphosis of the thoracic spine. Symmetric, no curvature, ROM normal, no CVA tenderness, no spinal tenderness   CVS: irregularly irregular rhythm, there are no murmurs, rubs, gallops, or heaves,  2+ pulses symmetric in all extremities.  ABDOMEN: Rounded and soft, nontender to palpation, non distended, no masses, no organomegaly, good bowel sounds, no rebound or guarding, no peritoneal signs.   EXTREMITIES: No pedal edema, Atraumatic. Full range of motion on both upper and lower extremities, there is no tenderness to palpation, no cyanosis or clubbing, no calf tenderness.  Pulses equal in all extremities, normal cap refill, no joint swelling.  SKIN: Warm and dry, no erythema noted.  Skin color, texture, no rashes or lesions. Left knee scabbed abrasion  NEUROLOGICAL: The patient is oriented to person, place and time. Strength and sensation are grossly intact. Face is symmetric.ambulates with 4 wheeled walker            LABS:   Recent Results (from the past 168 hour(s))   Magnesium   Result Value Ref Range    Magnesium 1.8 1.8 - 2.6 mg/dL   Potassium - Next AM   Result Value Ref Range    Potassium 3.8 3.5 - 5.0 mmol/L   Creatinine   Result Value Ref Range    Creatinine 0.63 0.60 - 1.10 mg/dL    GFR MDRD Af Amer >60 >60 mL/min/1.73m2    GFR MDRD Non Af Amer >60 >60 mL/min/1.73m2   HM2(CBC W/O DIFF)   Result Value Ref Range    WBC 9.8 4.0 - 11.0 thou/uL    RBC 3.84 3.80 - 5.40 mill/uL    Hemoglobin 11.9 (L) 12.0 - 16.0 g/dL    Hematocrit 36.0 35.0 - 47.0 %    MCV 94 80 - 100 fL    MCH 31.0 27.0 - 34.0 pg    MCHC 33.1 32.0 - 36.0 g/dL    RDW 12.4 11.0 - 14.5 %    Platelets 282 140 - 440 thou/uL    MPV 9.6 8.5 - 12.5  fL   Magnesium   Result Value Ref Range    Magnesium 1.7 (L) 1.8 - 2.6 mg/dL   Potassium - Next AM   Result Value Ref Range    Potassium 3.9 3.5 - 5.0 mmol/L   Basic Metabolic Panel   Result Value Ref Range    Sodium 139 136 - 145 mmol/L    Potassium 3.9 3.5 - 5.0 mmol/L    Chloride 111 (H) 98 - 107 mmol/L    CO2 19 (L) 22 - 31 mmol/L    Anion Gap, Calculation 9 5 - 18 mmol/L    Glucose 107 70 - 125 mg/dL    Calcium 9.6 8.5 - 10.5 mg/dL    BUN 17 8 - 22 mg/dL    Creatinine 0.59 (L) 0.60 - 1.10 mg/dL    GFR MDRD Af Amer >60 >60 mL/min/1.73m2    GFR MDRD Non Af Amer >60 >60 mL/min/1.73m2   Phosphorus   Result Value Ref Range    Phosphorus 4.2 2.5 - 4.5 mg/dL   Potassium - Next AM   Result Value Ref Range    Potassium 3.8 3.5 - 5.0 mmol/L   Magnesium   Result Value Ref Range    Magnesium 1.8 1.8 - 2.6 mg/dL   Magnesium   Result Value Ref Range    Magnesium 1.8 1.8 - 2.6 mg/dL   Basic Metabolic Panel   Result Value Ref Range    Sodium 139 136 - 145 mmol/L    Potassium 3.9 3.5 - 5.0 mmol/L    Chloride 107 98 - 107 mmol/L    CO2 22 22 - 31 mmol/L    Anion Gap, Calculation 10 5 - 18 mmol/L    Glucose 109 70 - 125 mg/dL    Calcium 10.0 8.5 - 10.5 mg/dL    BUN 16 8 - 22 mg/dL    Creatinine 0.68 0.60 - 1.10 mg/dL    GFR MDRD Af Amer >60 >60 mL/min/1.73m2    GFR MDRD Non Af Amer >60 >60 mL/min/1.73m2   POCT Glucose   Result Value Ref Range    Glucose 129 70 - 139 mg/dL   ECG 12 lead MUSE   Result Value Ref Range    SYSTOLIC BLOOD PRESSURE  mmHg    DIASTOLIC BLOOD PRESSURE  mmHg    VENTRICULAR RATE 58 BPM    ATRIAL RATE 58 BPM    P-R INTERVAL 146 ms    QRS DURATION 88 ms    Q-T INTERVAL 456 ms    QTC CALCULATION (BEZET) 447 ms    P Axis 52 degrees    R AXIS -9 degrees    T AXIS 135 degrees    MUSE DIAGNOSIS       Sinus bradycardia  Left ventricular hypertrophy with repolarization abnormality  Abnormal ECG  When compared with ECG of 20-MAY-2019 22:26,  Vent. rate has decreased BY  39 BPM  QT has shortened  Confirmed by CYNDIE ACOSTA,  TYRON LOC:WW ([id number]) on 5/28/2019 9:30:59 AM     Basic Metabolic Panel   Result Value Ref Range    Sodium 139 136 - 145 mmol/L    Potassium 3.7 3.5 - 5.0 mmol/L    Chloride 108 (H) 98 - 107 mmol/L    CO2 21 (L) 22 - 31 mmol/L    Anion Gap, Calculation 10 5 - 18 mmol/L    Glucose 134 (H) 70 - 125 mg/dL    Calcium 9.9 8.5 - 10.5 mg/dL    BUN 18 8 - 22 mg/dL    Creatinine 0.73 0.60 - 1.10 mg/dL    GFR MDRD Af Amer >60 >60 mL/min/1.73m2    GFR MDRD Non Af Amer >60 >60 mL/min/1.73m2   HM2(CBC W/O DIFF)   Result Value Ref Range    WBC 12.9 (H) 4.0 - 11.0 thou/uL    RBC 3.86 3.80 - 5.40 mill/uL    Hemoglobin 12.1 12.0 - 16.0 g/dL    Hematocrit 36.4 35.0 - 47.0 %    MCV 94 80 - 100 fL    MCH 31.3 27.0 - 34.0 pg    MCHC 33.2 32.0 - 36.0 g/dL    RDW 12.1 11.0 - 14.5 %    Platelets 396 140 - 440 thou/uL    MPV 10.3 8.5 - 12.5 fL   Troponin I   Result Value Ref Range    Troponin I 0.01 0.00 - 0.29 ng/mL   Levetiracetam [Keppra ]   Result Value Ref Range    Levetiracetam 17.7 6.0 - 46.0 ug/mL   Prolactin   Result Value Ref Range    Prolactin 51.7 (H) 0.0 - 20.0 ng/mL   Levetiracetam [Keppra ]   Result Value Ref Range    Levetiracetam 31.5 6.0 - 46.0 ug/mL   HM1 (CBC with Diff)   Result Value Ref Range    WBC 11.6 (H) 4.0 - 11.0 thou/uL    RBC 3.61 (L) 3.80 - 5.40 mill/uL    Hemoglobin 11.3 (L) 12.0 - 16.0 g/dL    Hematocrit 33.9 (L) 35.0 - 47.0 %    MCV 94 80 - 100 fL    MCH 31.3 27.0 - 34.0 pg    MCHC 33.3 32.0 - 36.0 g/dL    RDW 12.0 11.0 - 14.5 %    Platelets 356 140 - 440 thou/uL    MPV 9.9 8.5 - 12.5 fL    Neutrophils % 68 50 - 70 %    Lymphocytes % 18 (L) 20 - 40 %    Monocytes % 12 (H) 2 - 10 %    Eosinophils % 2 0 - 6 %    Basophils % 0 0 - 2 %    Neutrophils Absolute 7.8 (H) 2.0 - 7.7 thou/uL    Lymphocytes Absolute 2.1 0.8 - 4.4 thou/uL    Monocytes Absolute 1.4 (H) 0.0 - 0.9 thou/uL    Eosinophils Absolute 0.2 0.0 - 0.4 thou/uL    Basophils Absolute 0.0 0.0 - 0.2 thou/uL     Results for orders placed or performed  during the hospital encounter of 05/20/19   Basic Metabolic Panel   Result Value Ref Range    Sodium 139 136 - 145 mmol/L    Potassium 3.7 3.5 - 5.0 mmol/L    Chloride 108 (H) 98 - 107 mmol/L    CO2 21 (L) 22 - 31 mmol/L    Anion Gap, Calculation 10 5 - 18 mmol/L    Glucose 134 (H) 70 - 125 mg/dL    Calcium 9.9 8.5 - 10.5 mg/dL    BUN 18 8 - 22 mg/dL    Creatinine 0.73 0.60 - 1.10 mg/dL    GFR MDRD Af Amer >60 >60 mL/min/1.73m2    GFR MDRD Non Af Amer >60 >60 mL/min/1.73m2         Lab Results   Component Value Date    WBC 11.6 (H) 05/29/2019    HGB 11.3 (L) 05/29/2019    HCT 33.9 (L) 05/29/2019    MCV 94 05/29/2019     05/29/2019       No results found for: [id number]  Lab Results   Component Value Date    HGBA1C 5.4 05/21/2019     Lab Results   Component Value Date    INR 0.99 05/20/2019     No results found for: [id number]  Lab Results   Component Value Date    TSH 1.00 05/20/2019           ASSESSMENT/PLAN:    1. Atrial fibrillation: Rate controlled in 60s. No chest pain, palpitations, heart racing. On metoprolol and eliquis. F/u cardiology.   2. Complex partial seizures: On keppra. No recent seizure activity or unresponsive episodes. F/u neurology in 6-8 weeks. Seizures precautions.   3. Subacute left putamen CVA: On eliquis, atorvastatin. No residual deficits. F/u neurology.   4. Vertigo: referred to ENT for workup outpatient for vestibulopathy and family h/o Menieres disease. However no recent dizziness and trialing off meclizine.   5. HTN: SBP 120s. On lisinopril, metoprolol.   6. HLD: On atorvastatin.   7. GERD: On pepcid two times a day. tums prn.   8. Pruritus: On feet, intermittently. Requesting Hydrocortisone cream prn.   9. Fall, facial trauma: Soft tissue, ct negative for acute fracture, dislocation. Significant ecchymosis, hematoma in forehead. Improving. Ice prn. Tylenol prn pain.     Bmp, hm1 on 6/3, keppra level 6/3    Per therapy eval today      Electronically signed by: Lynette Almaguer,  NP    Total floor/unit time spent 35 with 25 time spent on counseling and coordination of care. Counseling was done regarding med changes, hospital course, pain management, a fib management, hypertension management, cva management. Counseling regarding need for specialty care follow up and lab monitoring. coordinated care with nursing and therapy for management of htn, seizures, cva, dizziness, lab monitoring, specialty care follow up, need for skilled therapy.
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
## 2021-06-19 NOTE — LETTER
Letter by Lynette Almaguer NP at      Author: Lynette Almaguer NP Service: -- Author Type: --    Filed:  Encounter Date: 2019 Status: (Other)         Patient: Galina Barnes   MR Number: [id number]   YOB: 1950   Date of Visit: 2019                 [facility]    DATE: 2019    NAME:  Galina Barnes             :  1950  MRN: [id number]  CODE STATUS:  FULL CODE    VISIT TYPE: Discharge Summary     FACILITY:  [facility] [[id number]]       CHIEF COMPLAIN/REASON FOR VISIT:    Chief Complaint   Patient presents with   ? Discharge Summary               HISTORY OF PRESENT ILLNESS: Galina Barnes is a 68 y.o. female who was admitted - for hypertensive urgency, A fib RVR, subacute Left putamen infarct. She had vertigo and fall with facial trauma at home. EEG suggested complex partial seizures. She was started on keppra and dose increased after another possible seizure/unresponsive episode on . Neurology Dr. Saravia followed during stay. She was recommended to f/u with neurology in 6-8 weeks. She was referred to ENT as outpatient for vestibulopathy and family history of Menieres disease. She was started on meclizine two times a day for dizziness. A fib was controlled with metoprolol and started on eliquis due to [id number] score of 6. She was recommended to follow up with cardiology and have outpatient stress test. She had diarrhea during stay after started on antibiotic and mag ox. This was negative for c diff and mag ox was discontinued with resolution of diarrhea. She was started on lisinopril for hypertensive urgency. She required replacement of potassium and magnesium during stay. She was treated for UTI with urine culture of E coli and had zosyn and later keflex. She had some mild hyperglycemia but no history of diabetes and hga1c was 5.4. She had nasal laceration and soft tissue facial trauma. She was discharged to TCU for further  rehab. She has PMH of chronic shoulder, knee, ankle pain, asthma, allergic rhinitis, GERD. She had not seen a medical provider in 27 years. Prior to this she lived at home alone in apartment. She did not use an assistive device and was independent for all cares.     TCU course:   Ms. Barnes has made progress with therapy and is ambulating 300 feet with walker and is independent for ADLs. She scored 16 on slums, 20/28 on tinnetti, 40/46 on soto. She remains high fall risk. Insurance issued last covered day of 6/3 so will be discharging on 6/4. During her short stay her vitals were controlled and keppra level was stable 31.5. She has an appt with ENT on 6/7 and cardiology on 7/17. She was not having any dizziness and meclizine stopped. She did not have any recurrence. Her facial ecchymosis is improving. She will be returning home 6/4 with  PT, Ot, HHA, RN, ST. Speech therapy will be following for cognitive impairment. She will f/u with new PCP in 5-7 days.     REVIEW OF SYSTEMS:  PROBLEMS AND REVIEW OF SYSTEMS:   Today on ROS:   Currently, no fever, chills, or rigors. Does not have any visual or hearing problems. Denies any chest pain, headaches, palpitations, lightheadedness, dizziness, shortness of breath, or cough. Appetite is good. Denies any GERD symptoms. Denies any difficulty with swallowing, nausea, or vomiting.  Denies any abdominal pain, diarrhea or constipation. Denies any urinary symptoms. No insomnia. No active bleeding. No rash. Positive for bruising on face, ambulates with 4 wheeled walker      No Known Allergies  Current Outpatient Medications   Medication Sig   ? acetaminophen (TYLENOL) 500 MG tablet Take 1,000 mg by mouth 3 (three) times a day as needed for pain.   ? apixaban (ELIQUIS) 5 mg Tab tablet Take 1 tablet (5 mg total) by mouth 2 (two) times a day.   ? atorvastatin (LIPITOR) 40 MG tablet Take 1 tablet (40 mg total) by mouth daily.   ? calcium, as carbonate, (TUMS) 200 mg calcium (500 mg)  chewable tablet Chew 1 tablet daily as needed for heartburn.   ? famotidine (PEPCID) 20 MG tablet Take 1 tablet (20 mg total) by mouth 2 (two) times a day.   ? hydrocortisone 2.5 % cream Apply 1 application topically every 6 (six) hours as needed.   ? levETIRAcetam (KEPPRA) 750 MG tablet Take 1 tablet (750 mg total) by mouth 2 (two) times a day.   ? lisinopril (PRINIVIL,ZESTRIL) 40 MG tablet Take 1 tablet (40 mg total) by mouth daily.   ? metoprolol succinate (TOPROL-XL) 200 MG 24 hr tablet Take 1 tablet (200 mg total) by mouth daily.     Past Medical History:    Past Medical History:   Diagnosis Date   ? Asthma            PHYSICAL EXAMINATION  Vitals:    06/03/19 2141   BP: 150/72   Pulse: 76   Resp: 16   Temp: 97  F (36.1  C)   SpO2: 97%   Weight: 134 lb (60.8 kg)       Today on physical exam:     GENERAL: Awake, Alert, oriented x3, not in any form of acute distress, answers questions appropriately, follows simple commands, conversant  HEENT: Head is normocephalic with normal hair distribution. No evidence of trauma. Ears: No acute purulent discharge. Eyes: Conjunctivae pink with no scleral jaundice. Nose: Normal mucosa and septum. NECK: Supple with no cervical or supraclavicular lymphadenopathy. Trachea is midline. Facial ecchymosis, small to mod size hematoma in middle of forehead, tender to palpation but improving  CHEST: No tenderness or deformity, no crepitus  LUNG: dim to auscultation with good chest expansion. There are no crackles or wheezes, normal AP diameter.  BACK: No kyphosis of the thoracic spine. Symmetric, no curvature, ROM normal, no CVA tenderness, no spinal tenderness   CVS: irregularly irregular rhythm, there are no murmurs, rubs, gallops, or heaves,  2+ pulses symmetric in all extremities.  ABDOMEN: Rounded and soft, nontender to palpation, non distended, no masses, no organomegaly, good bowel sounds, no rebound or guarding, no peritoneal signs.   EXTREMITIES: No pedal edema, Atraumatic. Full  range of motion on both upper and lower extremities, there is no tenderness to palpation, no cyanosis or clubbing, no calf tenderness.  Pulses equal in all extremities, normal cap refill, no joint swelling.  SKIN: Warm and dry, no erythema noted.  Skin color, texture, no rashes or lesions.   NEUROLOGICAL: The patient is oriented to person, place and time. Strength and sensation are grossly intact. Face is symmetric.ambulates with 4 wheeled walker            LABS:   Recent Results (from the past 168 hour(s))   Levetiracetam [Keppra ]   Result Value Ref Range    Levetiracetam 31.5 6.0 - 46.0 ug/mL   HM1 (CBC with Diff)   Result Value Ref Range    WBC 11.6 (H) 4.0 - 11.0 thou/uL    RBC 3.61 (L) 3.80 - 5.40 mill/uL    Hemoglobin 11.3 (L) 12.0 - 16.0 g/dL    Hematocrit 33.9 (L) 35.0 - 47.0 %    MCV 94 80 - 100 fL    MCH 31.3 27.0 - 34.0 pg    MCHC 33.3 32.0 - 36.0 g/dL    RDW 12.0 11.0 - 14.5 %    Platelets 356 140 - 440 thou/uL    MPV 9.9 8.5 - 12.5 fL    Neutrophils % 68 50 - 70 %    Lymphocytes % 18 (L) 20 - 40 %    Monocytes % 12 (H) 2 - 10 %    Eosinophils % 2 0 - 6 %    Basophils % 0 0 - 2 %    Neutrophils Absolute 7.8 (H) 2.0 - 7.7 thou/uL    Lymphocytes Absolute 2.1 0.8 - 4.4 thou/uL    Monocytes Absolute 1.4 (H) 0.0 - 0.9 thou/uL    Eosinophils Absolute 0.2 0.0 - 0.4 thou/uL    Basophils Absolute 0.0 0.0 - 0.2 thou/uL   Basic Metabolic Panel   Result Value Ref Range    Sodium 143 136 - 145 mmol/L    Potassium 3.6 3.5 - 5.0 mmol/L    Chloride 111 (H) 98 - 107 mmol/L    CO2 23 22 - 31 mmol/L    Anion Gap, Calculation 9 5 - 18 mmol/L    Glucose 94 70 - 125 mg/dL    Calcium 10.0 8.5 - 10.5 mg/dL    BUN 16 8 - 22 mg/dL    Creatinine 0.68 0.60 - 1.10 mg/dL    GFR MDRD Af Amer >60 >60 mL/min/1.73m2    GFR MDRD Non Af Amer >60 >60 mL/min/1.73m2   Levetiracetam [Keppra ]   Result Value Ref Range    Levetiracetam 25.5 6.0 - 46.0 ug/mL   HM1 (CBC with Diff)   Result Value Ref Range    WBC 9.7 4.0 - 11.0 thou/uL    RBC 3.46  (L) 3.80 - 5.40 mill/uL    Hemoglobin 10.8 (L) 12.0 - 16.0 g/dL    Hematocrit 32.7 (L) 35.0 - 47.0 %    MCV 95 80 - 100 fL    MCH 31.2 27.0 - 34.0 pg    MCHC 33.0 32.0 - 36.0 g/dL    RDW 11.9 11.0 - 14.5 %    Platelets 455 (H) 140 - 440 thou/uL    MPV 10.6 8.5 - 12.5 fL    Neutrophils % 67 50 - 70 %    Lymphocytes % 20 20 - 40 %    Monocytes % 8 2 - 10 %    Eosinophils % 5 0 - 6 %    Basophils % 0 0 - 2 %    Neutrophils Absolute 6.4 2.0 - 7.7 thou/uL    Lymphocytes Absolute 2.0 0.8 - 4.4 thou/uL    Monocytes Absolute 0.8 0.0 - 0.9 thou/uL    Eosinophils Absolute 0.4 0.0 - 0.4 thou/uL    Basophils Absolute 0.0 0.0 - 0.2 thou/uL     Results for orders placed or performed in visit on 06/03/19   Basic Metabolic Panel   Result Value Ref Range    Sodium 143 136 - 145 mmol/L    Potassium 3.6 3.5 - 5.0 mmol/L    Chloride 111 (H) 98 - 107 mmol/L    CO2 23 22 - 31 mmol/L    Anion Gap, Calculation 9 5 - 18 mmol/L    Glucose 94 70 - 125 mg/dL    Calcium 10.0 8.5 - 10.5 mg/dL    BUN 16 8 - 22 mg/dL    Creatinine 0.68 0.60 - 1.10 mg/dL    GFR MDRD Af Amer >60 >60 mL/min/1.73m2    GFR MDRD Non Af Amer >60 >60 mL/min/1.73m2         Lab Results   Component Value Date    WBC 9.7 06/03/2019    HGB 10.8 (L) 06/03/2019    HCT 32.7 (L) 06/03/2019    MCV 95 06/03/2019     (H) 06/03/2019       No results found for: [id number]  Lab Results   Component Value Date    HGBA1C 5.4 05/21/2019     Lab Results   Component Value Date    INR 0.99 05/20/2019     No results found for: [id number]  Lab Results   Component Value Date    TSH 1.00 05/20/2019           ASSESSMENT/PLAN:    1. Atrial fibrillation: Rate controlled in 70s. No chest pain, palpitations, heart racing. On metoprolol and eliquis. F/u cardiology.   2. Complex partial seizures: On keppra. No recent seizure activity or unresponsive episodes. F/u neurology in 6-8 weeks. Seizures precautions. Keppra level 31.5 on 5/29.   3. Subacute left putamen CVA: On eliquis, atorvastatin. No  residual deficits. F/u neurology.   4. Vertigo: referred to ENT for workup outpatient for vestibulopathy and family h/o Menieres disease. Off meclizine. ENT 6/7.   5. HTN: SBP 150s. On lisinopril, metoprolol.   6. HLD: On atorvastatin.   7. GERD: On pepcid two times a day. tums prn.   8. Pruritus: On feet, intermittently. Requesting Hydrocortisone cream prn.   9. Fall, facial trauma: Soft tissue, ct negative for acute fracture, dislocation. Significant ecchymosis, hematoma in forehead. Improving. Ice prn. Tylenol prn pain.       Electronically signed by: Lynette Almaguer NP    Total floor/unit time 35 min with 25 min spent on counseling and coordination of care. Counseling regarding med changes during tcu stay, seizure management, lab results, cva management, need for further specialty care follow up. Counseling regarding primary care management. Coordinated care with nursing, therapy,  for discharge planning, med orders for discharge, specialty care follow up. Educated on importance of med compliance and medical follow up.     Please evaluate Galina Barnes for admission to Home Health.    Face to Face Attestation and Initial Plan of Care    The face-to-face encounter occurred on date: 6/4/19  Face to Face encounter was with: Lynette Almaguer    Please provide brief clinical summary of reason for visit and need for home care. Deconditioning after hospital stay for a fib, seizures, cva, vertigo    Please identify which of the following home health disciplines the patient will need AND describe the skilled services that you would like the home health agency to perform: SKILLED NURSING (RN): perform/teach anticoagulation and complex med management, PHYSICAL THERAPY: strength training and gait training, OCCUPATIONAL THERAPY: ADLs and home safety, SPEECH LANGUAGE PATHOLOGY:other cognitive impairment and HOME HEALTH AIDE    Homebound Status (describe the functional limitations that support this patient is  "confined to his/her home. Medicaid recipients are not required to be homebound.):assistive device needed:  2WW    Name of physician who will be responsible for the ongoing home health plan of care (CMS requires the referring physician to provide the specific name of the community physician instead of a title, such as \"PCP\"): Jose Bryant MD    Requested Start of Care Date: Within 48 hours    Other information to assist the home health agency in developing the initial Plan of Care:    I certify that services are/were furnished while this patient was under the care of a physician and that a physician or an allowed non-physician practitioner (NPP), had a face-to-face encounter that meets the physician face-to-face encounter requirements. The encounter was in whole, or in part, related to the primary reason for home health. The patient is confined to his/her home and needs intermittent skilled nursing, physical therapy, speech-language pathology, or the continued need for occupational therapy. A plan of care has been established by a physician and is periodically reviewed by a physician.                                "

## 2021-06-26 DIAGNOSIS — K21.9 GASTROESOPHAGEAL REFLUX DISEASE WITHOUT ESOPHAGITIS: ICD-10-CM
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
## 2021-06-27 NOTE — PROGRESS NOTES
"Progress Notes by Laura Valle AuD at 7/26/2019  1:30 PM     Author: Laura Valle AuD Service: -- Author Type: Audiologist    Filed: 7/26/2019  4:56 PM Encounter Date: 7/26/2019 Status: Signed    : Laura Valle AuD (Audiologist)       Audiology Report    Referring Provider:   Service    History:  Galina Barnes is seen in conjunction with ENT appointment today. Summary: Audiology visit completed. Please see audiogram below or in \"media\" tab for case history and results.       Results:   Transducer: Insert earphones and Circumaural headphones    Reliability was good  and there was good  SRT to PTA agreement.       Plan:  The patient is returned to ENT for follow up.  She should return for retesting with ENT recommendation.     Reece Garduno, CCC-A  [place] Licensed Audiologist #[id number]                 "

## 2021-06-27 NOTE — PROGRESS NOTES
Progress Notes by Callie Neal CNP at 7/17/2019  9:50 AM     Author: Callie Neal CNP Service: -- Author Type: Nurse Practitioner    Filed: 7/17/2019 11:35 AM Encounter Date: 7/17/2019 Status: Signed    : Callie Neal CNP (Nurse Practitioner)           Click to link to [facility] Heart Care     [facility] HEART CARE ELECTROPHYSIOLOGY NOTE      Assessment/Recommendations   Assessment/Plan:    1.  Paroxysmal atrial fibrillation: Appears to be asymptomatic.  Ventricular rate in A. fib was controlled at recent primary care visit.  We had a lengthy discussion of the physiology natural progression of atrial fibrillation as well as treatment options of rate control versus rhythm control depending upon the presence of symptoms.  She was instructed to check her pulse daily and keep a log of A. fib episodes including associated symptoms.  Continue metoprolol succinate 200 mg daily.  She was instructed to take the metoprolol at night to see if this improves her daytime tiredness.    She was reassured that atrial fibrillation is not life-threatening, but carries an increased risk for stroke.  She has a QCT1ST5-SMMi score of 5 for age >65, female gender, hypertension, and CVA.  We discussed the importance of long-term anticoagulation for stroke prophylaxis.  She was switched from Eliquis to Xarelto due to insurance coverage.  Continue Xarelto 20 mg daily.  She is taking this with her evening meal and denies side effects, bleeding issues, or missed doses.  We discussed the importance of taking this with a full meal to ensure proper absorption.    2.  Hypertension: Systolic blood pressure slightly elevated today, but consistently at target per home readings.  Continue metoprolol, lisinopril, and amlodipine.    Follow-up with Dr. Bolaños  Follow up with me in 6 months.     History of Present Illness    Ms. Galina Barnes is a very pleasant 68 y.o. female who comes in today for EP consultation of atrial fibrillation.  She  "was newly diagnosed with atrial fibrillation with rapid ventricular response in May 2019 when she was hospitalized after a mechanical fall.  She spontaneously converted back to sinus rhythm.  During this hospitalization, she was also diagnosed with an acute or early subacute CVA, complex partial seizures, and hypertensive urgency.  MRI also showed changes consistent with long-standing hypertension.  She was started on Eliquis for stroke prophylaxis.  Blood pressures are controlled with metoprolol succinate 200 mg daily and lisinopril 40 mg daily.  Echo showed normal cardiac structure and function.  She was also started on Keppra for seizure disorder.  She was discharged to TCU, but is now home.    Alan states that she is feeling well, but is tired particularly in the morning.  She is still walking with a cane, but states her balance is much improved and she is increasing her activity.  She has had no symptomatic recurrence of A. fib.  She reports that she had no symptoms despite being in A. fib at her last clinic visit; heart rates were controlled in the 60s.  She denies chest discomfort, palpitations, abdominal fullness/bloating or peripheral edema, shortness of breath, paroxysmal nocturnal dyspnea, orthopnea, lightheadedness, dizziness, pre-syncope, or syncope.  Is been checking her blood pressure daily and reports systolic blood pressure consistently 120 to 130 mmHg.  Rates are consistently in the 60s.  Her blood pressure cuff has noted no \"irregular heartbeat\".    Cardiographics (personally reviewed):  EKG, Done 2019 shows sinus bradycardia 58 bpm, QT/QTc interval measures 456/447 ms  EKG done 5/20/2019 shows atrial fibrillation with rapid ventricular response at 149 bpm.  Subsequent EKG shows sinus rhythm at 97 bpm with PACs.    Echo done 5/21/2019:  1. Normal left ventricular size and systolic performance with a visually estimated ejection fraction of 60%.   2. There is mild aortic insufficiency.   3. Normal " right ventricular size and systolic performance.   4. There is mild left atrial enlargement.        Physical Examination Review of Systems   Vitals:    07/17/19 0956   BP: 150/70   Pulse: 64   Resp: 16     Body mass index is 24.99 kg/m .  Wt Readings from Last 3 Encounters:   07/17/19 141 lb 1.6 oz (64 kg)   06/03/19 134 lb (60.8 kg)   05/30/19 136 lb (61.7 kg)     General Appearance:   Alert, well-appearing and in no acute distress.   HEENT: Atraumatic, normocephalic.  No scleral icterus, normal conjunctivae, EOM intact, PERRL; mucous membranes pink and moist.  No obvious thyromegaly.   Chest: Chest symmetric, spine straight.   Lungs:   Respirations unlabored: Lungs are clear to auscultation.   Cardiovascular:   Normal first and second heart sounds with no murmurs, rubs, or gallops.  Regular rate and rhythm.  Radial and posterior tibial pulses are intact, no JVD, no edema.   Abdomen:  Soft, nontender, nondistended, bowel sounds present   Extremities: No cyanosis or clubbing   Musculoskeletal: Moves all extremities   Skin: Warm, dry, intact.    Neurologic: Mood and affect are appropriate, alert and oriented to person, place, time, and situation    General: WNL  Eyes: WNL  Ears/Nose/Throat: WNL  Lungs: WNL  Heart: WNL  Stomach: Diarrhea  Bladder: Frequent Urination at Night  Muscle/Joints: Joint Pain  Skin: WNL  Nervous System: Falls, Daytime Sleepiness, Dizziness, Loss of Balance  Mental Health: Depression, Anxiety     Blood: WNL     Medical History  Surgical History Family History Social History   Past Medical History:   Diagnosis Date   ? Asthma     Past Surgical History:   Procedure Laterality Date   ? TUBAL LIGATION      Family History   Problem Relation Age of Onset   ? Diabetes Maternal Grandfather    ? Alcoholism Other     Social History     Socioeconomic History   ? Marital status: Single     Spouse name: Not on file   ? Number of children: Not on file   ? Years of education: Not on file   ? Highest  education level: Not on file   Occupational History   ? Not on file   Social Needs   ? Financial resource strain: Not on file   ? Food insecurity:     Worry: Not on file     Inability: Not on file   ? Transportation needs:     Medical: Not on file     Non-medical: Not on file   Tobacco Use   ? Smoking status: Never Smoker   ? Smokeless tobacco: Never Used   Substance and Sexual Activity   ? Alcohol use: Never     Frequency: Never   ? Drug use: Never   ? Sexual activity: Not on file   Lifestyle   ? Physical activity:     Days per week: Not on file     Minutes per session: Not on file   ? Stress: Not on file   Relationships   ? Social connections:     Talks on phone: Not on file     Gets together: Not on file     Attends Denominational service: Not on file     Active member of club or organization: Not on file     Attends meetings of clubs or organizations: Not on file     Relationship status: Not on file   ? Intimate partner violence:     Fear of current or ex partner: Not on file     Emotionally abused: Not on file     Physically abused: Not on file     Forced sexual activity: Not on file   Other Topics Concern   ? Not on file   Social History Narrative   ? Not on file          Medications  Allergies   Current Outpatient Medications   Medication Sig Dispense Refill   ? acetaminophen (TYLENOL) 500 MG tablet Take 1,000 mg by mouth 3 (three) times a day as needed for pain.     ? atorvastatin (LIPITOR) 40 MG tablet Take 1 tablet (40 mg total) by mouth daily. 30 tablet 0   ? calcium, as carbonate, (TUMS) 200 mg calcium (500 mg) chewable tablet Chew 1 tablet daily as needed for heartburn.     ? famotidine (PEPCID) 20 MG tablet Take 1 tablet (20 mg total) by mouth 2 (two) times a day. 60 tablet 0   ? hydrocortisone 2.5 % cream Apply 1 application topically every 6 (six) hours as needed.     ? levETIRAcetam (KEPPRA) 750 MG tablet Take 1 tablet (750 mg total) by mouth 2 (two) times a day. 60 tablet 0   ? lisinopril  (PRINIVIL,ZESTRIL) 40 MG tablet Take 1 tablet (40 mg total) by mouth daily. 30 tablet 0   ? metoprolol succinate (TOPROL-XL) 200 MG 24 hr tablet Take 1 tablet (200 mg total) by mouth daily. 30 tablet 0   ? rivaroxaban (XARELTO) 20 mg tablet Take 20 mg by mouth daily.       No current facility-administered medications for this visit.       No Known Allergies   Medical, surgical, family, social history, and medications were all reviewed and updated as necessary.   Lab Results    Chemistry CBC Other   Lab Results   Component Value Date    CREATININE 0.68 06/03/2019    BUN 16 06/03/2019     06/03/2019    K 3.6 06/03/2019     (H) 06/03/2019    CO2 23 06/03/2019     Creatinine (mg/dL)   Date Value   06/03/2019 0.68   05/28/2019 0.73   05/28/2019 0.68   05/26/2019 0.59 (L)    Lab Results   Component Value Date    WBC 9.7 06/03/2019    HGB 10.8 (L) 06/03/2019    HCT 32.7 (L) 06/03/2019    MCV 95 06/03/2019     (H) 06/03/2019    Lab Results   Component Value Date    INR 0.99 05/20/2019     Lab Results   Component Value Date    TSH 1.00 05/20/2019              This note has been dictated using voice recognition software. Any grammatical, typographical, or context distortions are unintentional and inherent to the software.    Callie Neal, [facility]   Electrophysiology

## 2021-06-28 RX ORDER — FAMOTIDINE 20 MG/1
TABLET, FILM COATED ORAL
Qty: 120 TABLET | Refills: 0 | Status: SHIPPED | OUTPATIENT
Start: 2021-06-28 | End: 2021-08-16

## 2021-06-28 NOTE — PROGRESS NOTES
Progress Notes by Kirstie Bolaños MD at 9/13/2019  2:30 PM     Author: Kirstie Bolaños MD Service: -- Author Type: Physician    Filed: 9/13/2019  3:06 PM Encounter Date: 9/13/2019 Status: Signed    : Kirstie Bolaños MD (Physician)           Click to link to [facility] Heart Care     [facility] HEART CARE NOTE    Thank you, Dr. Bryant, for asking the [facility] team to see Ms. Galina Barnes to follow-up on paroxysmal atrial fibrillation.    Assessment/Recommendations   Assessment:    1.  Paroxysmal atrial fibrillation, resolved.  She has had only an occasional recurrence since her hospitalization in May 2019 when I saw her initially.  She has been maintained on metoprolol as well as Xarelto to minimize risks of thromboembolic events.  No bleeding complications have occurred in the interim.  She is tentatively scheduled to undergo knee replacement surgery in early December.  Given her normal LV function, I feel she could safely be taken off Xarelto without bridging.  Her Xarelto should be restarted the day following surgery.  2.  Accelerated hypertension, now well controlled on current doses of metoprolol and lisinopril.  3.  Status post CVA, likely cardioembolic from paroxysmal atrial fibrillation although cannot exclude a hypertensive cause.      Plan:  1.  Continue current medications  2.  Follow-up in A. fib clinic in February       History of Present Illness    Ms. Galina Barnes is a 69 y.o. female with unremarkable past medical history who was hospitalized in May following a fall at home and subsequent finding of atrial fibrillation with rapid ventricular response.  Echocardiogram demonstrated normal LV function and only mild left atrial enlargement.  She was noted to have evidence of acute CVA although it was unclear whether this was due to hypertension or an embolic event.  She was subsequently placed on beta-blocker for rate control as well as anticoagulation and subsequently discharged home.   Over the course of the last 4 months, she has done fairly well.  She reports that her heart rate and blood pressure at home have been fairly stable.  Occasionally her heart rate is been slightly higher suggesting recurrent atrial fibrillation although she is unaware of any palpitations.  No chest discomfort or shortness of breath.    ECG (personally reviewed): No ECG today    Cardiac Imaging Studies (personally reviewed): No new imaging     Physical Examination Review of Systems   Vitals:    09/13/19 1439   BP: 154/78   Pulse: (!) 59   Resp: 12     Body mass index is 25.86 kg/m .  Wt Readings from Last 3 Encounters:   09/13/19 146 lb (66.2 kg)   07/17/19 141 lb 1.6 oz (64 kg)   06/03/19 134 lb (60.8 kg)     General Appearance:   Awake, Alert, No acute distress.   HEENT:  No scleral icterus; the mucous membranes were pink and moist.   Neck: No cervical bruits or jugular venous distention    Chest: The spine was straight. The chest was symmetric.   Lungs:   Respirations unlabored; the lungs are clear to auscultation. No wheezing   Cardiovascular:    Regular rate and rhythm.  S1, S2 normal.  No murmur, gallop or rub   Abdomen:  No organomegaly, masses, bruits, or tenderness. Bowels sounds are present   Extremities:  No peripheral edema, clubbing or cyanosis.   Skin: No xanthelasma. Warm, Dry.   Musculoskeletal: No tenderness.   Neurologic: Mood and affect are appropriate.    General: WNL  Eyes: WNL  Ears/Nose/Throat: WNL  Lungs: Cough  Heart: WNL  Stomach: Diarrhea  Bladder: Frequent Urination at Night  Muscle/Joints: WNL  Skin: WNL  Nervous System: WNL  Mental Health: WNL     Blood: WNL     Medical History  Surgical History Family History Social History   Past Medical History:   Diagnosis Date   ? Asthma    -Paroxysmal atrial fibrillation  -Accelerated hypertension  -Status post CVA Past Surgical History:   Procedure Laterality Date   ? TUBAL LIGATION      Family History   Problem Relation Age of Onset   ? Diabetes  Maternal Grandfather    ? Alcoholism Other     Social History     Socioeconomic History   ? Marital status: Single     Spouse name: Not on file   ? Number of children: Not on file   ? Years of education: Not on file   ? Highest education level: Not on file   Occupational History   ? Not on file   Social Needs   ? Financial resource strain: Not on file   ? Food insecurity:     Worry: Not on file     Inability: Not on file   ? Transportation needs:     Medical: Not on file     Non-medical: Not on file   Tobacco Use   ? Smoking status: Never Smoker   ? Smokeless tobacco: Never Used   Substance and Sexual Activity   ? Alcohol use: Never     Frequency: Never   ? Drug use: Never   ? Sexual activity: Not on file   Lifestyle   ? Physical activity:     Days per week: Not on file     Minutes per session: Not on file   ? Stress: Not on file   Relationships   ? Social connections:     Talks on phone: Not on file     Gets together: Not on file     Attends Jain service: Not on file     Active member of club or organization: Not on file     Attends meetings of clubs or organizations: Not on file     Relationship status: Not on file   ? Intimate partner violence:     Fear of current or ex partner: Not on file     Emotionally abused: Not on file     Physically abused: Not on file     Forced sexual activity: Not on file   Other Topics Concern   ? Not on file   Social History Narrative   ? Not on file          Medications  Allergies   Current Outpatient Medications   Medication Sig Dispense Refill   ? acetaminophen (TYLENOL) 500 MG tablet Take 1,000 mg by mouth 3 (three) times a day as needed for pain.     ? atorvastatin (LIPITOR) 40 MG tablet Take 1 tablet (40 mg total) by mouth daily. 30 tablet 0   ? calcium, as carbonate, (TUMS) 200 mg calcium (500 mg) chewable tablet Chew 1 tablet daily as needed for heartburn.     ? famotidine (PEPCID) 20 MG tablet Take 1 tablet (20 mg total) by mouth 2 (two) times a day. 60 tablet 0   ?  hydrocortisone 2.5 % cream Apply 1 application topically every 6 (six) hours as needed.     ? levETIRAcetam (KEPPRA) 750 MG tablet Take 1 tablet (750 mg total) by mouth 2 (two) times a day. 60 tablet 0   ? lisinopril (PRINIVIL,ZESTRIL) 40 MG tablet Take 1 tablet (40 mg total) by mouth daily. 30 tablet 0   ? metoprolol succinate (TOPROL-XL) 200 MG 24 hr tablet Take 1 tablet (200 mg total) by mouth daily. 30 tablet 0   ? rivaroxaban (XARELTO) 20 mg tablet Take 20 mg by mouth daily.       No current facility-administered medications for this visit.       No Known Allergies      Lab Results    Chemistry/lipid CBC Cardiac Enzymes/BNP/TSH/INR   Lab Results   Component Value Date    CHOL 218 (H) 05/21/2019    HDL 57 05/21/2019    LDLCALC 147 (H) 05/21/2019    TRIG 70 05/21/2019    CREATININE 0.68 06/03/2019    BUN 16 06/03/2019    K 4.0 08/05/2019     08/05/2019     (H) 08/05/2019    CO2 25 08/05/2019    Lab Results   Component Value Date    WBC 9.7 06/03/2019    HGB 10.8 (L) 06/03/2019    HCT 32.7 (L) 06/03/2019    MCV 95 06/03/2019     (H) 06/03/2019    Lab Results   Component Value Date    TROPONINI 0.01 05/28/2019     (H) 05/20/2019    TSH 1.00 05/20/2019    INR 0.99 05/20/2019

## 2021-06-29 NOTE — PROGRESS NOTES
"Progress Notes by Callie Neal CNP at 7/15/2020  8:30 AM     Author: Callie Neal CNP Service: -- Author Type: Nurse Practitioner    Filed: 7/15/2020  8:59 AM Encounter Date: 7/15/2020 Status: Signed    : Callie Neal CNP (Nurse Practitioner)           The patient has been notified of following:     \"This telephone visit will be conducted via a call between you and your physician/provider. We have found that certain health care needs can be provided without the need for a physical exam.  This service lets us provide the care you need with a phone conversation.  If a prescription is necessary we can send it directly to your pharmacy.  If lab work is needed we can place an order for that and you can then stop by our lab to have the test done at a later time. If during the course of the call the physician/provider feels a telephone visit is not appropriate, you will not be charged for this service.\" Verbal consent has been obtained for this service by care team member:         HEART CARE PHONE ENCOUNTER        The patient has chosen to have the visit conducted as a telephone visit, to reduce risk of exposure given the current status of Coronavirus in our community. This telephone visit is being conducted via a call between the patient and physician/provider. Health care needs are being provided without a physical exam.     Assessment/Recommendations   Assessment and Plan:    1.  Paroxysmal atrial fibrillation:  No symptomatology or evidence of A. fib, though she is likely asymptomatic.  She has had noted no irregularity when she checks her pulse and did not have any A. fib detected during her knee replacement last fall.   We reviewed the physiology natural progression of atrial fibrillation as well as treatment options of rate control versus rhythm control depending upon the presence of symptoms.  She was instructed to continue her pulse checks and to keep a log of A. fib episodes including associated " symptoms.  Continue metoprolol succinate 200 mg daily.       She was reassured that atrial fibrillation is not life-threatening, but carries an increased risk for stroke.  She has a [id number] score of 5 for age >65, female gender, hypertension, and CVA.  We discussed the importance of long-term anticoagulation for stroke prophylaxis.  Continue Xarelto 20 mg daily.  She is taking this with her evening meal and denies side effects, bleeding issues, or missed doses.  We discussed the importance of taking this with a full meal to ensure proper absorption.     2.  Hypertension: Blood pressure at target per home readings.  Continue metoprolol, lisinopril, and amlodipine.      Follow Up Plan: Follow up in 1 year  I have reviewed the note as documented.  This accurately captures the substance of my conversation with the patient.    Total time of call between patient and provider was 9 minutes   Start Time:0837   Stop Time:0846       History of Present Illness/Subjective    Galina Barnes is a 69 y.o. female who is being evaluated via a billable telephone visit.  She was newly diagnosed with atrial fibrillation with rapid ventricular response in May 2019 when she was hospitalized after a mechanical fall.  She spontaneously converted back to sinus rhythm.  Of note, she had no awareness of A. fib or associated symptoms. During this hospitalization, she was also diagnosed with an acute or early subacute CVA, complex partial seizures, and hypertensive urgency.  MRI also showed changes consistent with long-standing hypertension.  Echo showed normal cardiac structure and function.  She was also started on Keppra for seizure disorder.  She remains on Xarelto for stroke prophylaxis.     [facility] states that she has been feeling very well.   She has had no symptomatic recurrence of A. fib.  She frequently checks her pulse and has noted no irregularity.  Her blood pressure is consistently 110s/60s with heart rates in the 60s.  She  denies chest discomfort, palpitations, abdominal fullness/bloating or peripheral edema, shortness of breath, paroxysmal nocturnal dyspnea, orthopnea, lightheadedness, dizziness, pre-syncope, or syncope.       Cardiographics (EKGs personally reviewed):  EKG done 11/18/2019 shows sinus rhythm at 64 bpm  EKG, Done 5/28/2019 shows sinus bradycardia 58 bpm, QT/QTc interval measures 456/447 ms  EKG done 5/20/2019 shows atrial fibrillation with rapid ventricular response at 149 bpm.  Subsequent EKG shows sinus rhythm at 97 bpm with PACs.     Echo done 5/21/2019:  1. Normal left ventricular size and systolic performance with a visually estimated ejection fraction of 60%.   2. There is mild aortic insufficiency.   3. Normal right ventricular size and systolic performance.   4. There is mild left atrial enlargement.     I have reviewed and updated the patient's Past Medical History, Social History, Family History and Medication List.     Physical Examination not performed given phone encounter Review of Systems          See CMA note attached                                      Medical History  Surgical History Family History Social History   Past Medical History:   Diagnosis Date   ? A-fib (H)    ? Acid reflux    ? Arthritis    ? Asthma    ? GERD (gastroesophageal reflux disease)    ? Hay fever    ? Hyperlipidemia    ? Hypertension    ? 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 Nonintractable epilepsy with complex partial seizures (H)    ? Seizures (H) 2019    while hospitalized for TIA.  no recurrance since   ? Tension headache    ? TIA (transient ischemic attack) 04/2019    hospiltalized at [facility]    Past Surgical History:   Procedure Laterality Date   ? AR TOTAL KNEE ARTHROPLASTY Right 12/4/2019    Procedure: RIGHT TOTAL KNEE ARTHROPLASTY;  Surgeon: Shahram Aguilar MD;  Location: [facility];  Service: Orthopedics   ? TUBAL LIGATION      Family History   Problem Relation Age of Onset   ? Diabetes Maternal  Grandfather    ? Alcoholism Other     Social History     Socioeconomic History   ? Marital status: Single     Spouse name: Not on file   ? Number of children: Not on file   ? Years of education: Not on file   ? Highest education level: Not on file   Occupational History   ? Not on file   Social Needs   ? Financial resource strain: Not on file   ? Food insecurity     Worry: Not on file     Inability: Not on file   ? Transportation needs     Medical: Not on file     Non-medical: Not on file   Tobacco Use   ? Smoking status: Never Smoker   ? Smokeless tobacco: Never Used   Substance and Sexual Activity   ? Alcohol use: Never     Frequency: Never   ? Drug use: Never   ? Sexual activity: Not on file   Lifestyle   ? Physical activity     Days per week: Not on file     Minutes per session: Not on file   ? Stress: Not on file   Relationships   ? Social connections     Talks on phone: Not on file     Gets together: Not on file     Attends Hindu service: Not on file     Active member of club or organization: Not on file     Attends meetings of clubs or organizations: Not on file     Relationship status: Not on file   ? Intimate partner violence     Fear of current or ex partner: Not on file     Emotionally abused: Not on file     Physically abused: Not on file     Forced sexual activity: Not on file   Other Topics Concern   ? Not on file   Social History Narrative   ? Not on file          Medications  Allergies   Current Outpatient Medications   Medication Sig Dispense Refill   ? acetaminophen (TYLENOL) 500 MG tablet Take 1,000 mg by mouth 3 (three) times a day as needed for pain.     ? amLODIPine (NORVASC) 10 MG tablet Take 10 mg by mouth every evening.      ? atorvastatin (LIPITOR) 40 MG tablet Take 1 tablet (40 mg total) by mouth daily. 30 tablet 0   ? calcium, as carbonate, (TUMS) 200 mg calcium (500 mg) chewable tablet Chew 1-2 tablets 3 (three) times a day as needed for heartburn.      ? cetirizine (ZYRTEC) 10 MG  tablet Take 10 mg by mouth daily as needed for allergies.      ? famotidine (PEPCID) 20 MG tablet Take 1 tablet (20 mg total) by mouth 2 (two) times a day. 60 tablet 0   ? levETIRAcetam (KEPPRA) 750 MG tablet Take 1 tablet (750 mg total) by mouth 2 (two) times a day. 60 tablet 0   ? lisinopril (PRINIVIL,ZESTRIL) 40 MG tablet Take 1 tablet (40 mg total) by mouth daily. 30 tablet 0   ? metoprolol succinate (TOPROL-XL) 200 MG 24 hr tablet Take 1 tablet (200 mg total) by mouth daily. 30 tablet 0   ? rivaroxaban (XARELTO) 20 mg tablet Take 20 mg by mouth daily.     ? senna-docusate (PERICOLACE) 8.6-50 mg tablet Take 1 tablet by mouth 2 (two) times a day as needed for constipation.  0     No current facility-administered medications for this visit.     No Known Allergies      Lab Results    Chemistry/lipid CBC Cardiac Enzymes/BNP/TSH/INR   Lab Results   Component Value Date    CHOL 218 (H) 05/21/2019    HDL 57 05/21/2019    LDLCALC 147 (H) 05/21/2019    TRIG 70 05/21/2019    CREATININE 0.75 12/05/2019    BUN 24 (H) 12/05/2019    K 4.7 12/05/2019     12/05/2019     (H) 12/05/2019    CO2 20 (L) 12/05/2019    Lab Results   Component Value Date    WBC 18.3 (H) 12/05/2019    HGB 8.2 (L) 12/05/2019    HCT 25.1 (L) 12/05/2019     (H) 12/05/2019     12/05/2019    Lab Results   Component Value Date    TROPONINI 0.01 05/28/2019     (H) 05/20/2019    TSH 1.00 05/20/2019    INR 1.06 12/04/2019        Callie Neal CNP  Cardiac Electrophysiology

## 2021-08-16 ENCOUNTER — OFFICE VISIT (OUTPATIENT)
Dept: FAMILY MEDICINE | Facility: CLINIC | Age: 71
End: 2021-08-16
Payer: COMMERCIAL

## 2021-08-16 VITALS
SYSTOLIC BLOOD PRESSURE: 133 MMHG | HEART RATE: 69 BPM | RESPIRATION RATE: 16 BRPM | BODY MASS INDEX: 27.57 KG/M2 | OXYGEN SATURATION: 98 % | DIASTOLIC BLOOD PRESSURE: 72 MMHG | WEIGHT: 160.6 LBS | TEMPERATURE: 98.3 F

## 2021-08-16 DIAGNOSIS — E78.5 HYPERLIPIDEMIA, UNSPECIFIED HYPERLIPIDEMIA TYPE: ICD-10-CM

## 2021-08-16 DIAGNOSIS — I48.0 PAROXYSMAL ATRIAL FIBRILLATION (H): ICD-10-CM

## 2021-08-16 DIAGNOSIS — K21.9 GASTROESOPHAGEAL REFLUX DISEASE WITHOUT ESOPHAGITIS: ICD-10-CM

## 2021-08-16 DIAGNOSIS — I10 BENIGN ESSENTIAL HYPERTENSION: ICD-10-CM

## 2021-08-16 DIAGNOSIS — Z23 NEED FOR VACCINATION: Primary | ICD-10-CM

## 2021-08-16 DIAGNOSIS — Z00.00 ENCOUNTER FOR MEDICARE ANNUAL WELLNESS EXAM: ICD-10-CM

## 2021-08-16 DIAGNOSIS — Z12.31 ENCOUNTER FOR SCREENING MAMMOGRAM FOR BREAST CANCER: ICD-10-CM

## 2021-08-16 DIAGNOSIS — J30.2 SEASONAL ALLERGIC RHINITIS, UNSPECIFIED TRIGGER: ICD-10-CM

## 2021-08-16 DIAGNOSIS — Z78.0 POSTMENOPAUSAL STATUS: ICD-10-CM

## 2021-08-16 DIAGNOSIS — Z00.00 WELCOME TO MEDICARE PREVENTIVE VISIT: ICD-10-CM

## 2021-08-16 DIAGNOSIS — G40.209 PARTIAL SYMPTOMATIC EPILEPSY WITH COMPLEX PARTIAL SEIZURES, NOT INTRACTABLE, WITHOUT STATUS EPILEPTICUS (H): ICD-10-CM

## 2021-08-16 PROCEDURE — 90732 PPSV23 VACC 2 YRS+ SUBQ/IM: CPT | Performed by: STUDENT IN AN ORGANIZED HEALTH CARE EDUCATION/TRAINING PROGRAM

## 2021-08-16 PROCEDURE — G0438 PPPS, INITIAL VISIT: HCPCS | Mod: 25 | Performed by: STUDENT IN AN ORGANIZED HEALTH CARE EDUCATION/TRAINING PROGRAM

## 2021-08-16 PROCEDURE — G0009 ADMIN PNEUMOCOCCAL VACCINE: HCPCS | Performed by: STUDENT IN AN ORGANIZED HEALTH CARE EDUCATION/TRAINING PROGRAM

## 2021-08-16 RX ORDER — FAMOTIDINE 20 MG/1
20 TABLET, FILM COATED ORAL 2 TIMES DAILY
Qty: 180 TABLET | Refills: 3 | Status: SHIPPED | OUTPATIENT
Start: 2021-08-16 | End: 2022-08-22

## 2021-08-16 RX ORDER — AMLODIPINE BESYLATE 10 MG/1
10 TABLET ORAL DAILY
Qty: 90 TABLET | Refills: 3 | Status: SHIPPED | OUTPATIENT
Start: 2021-08-16 | End: 2022-08-22

## 2021-08-16 RX ORDER — LISINOPRIL 40 MG/1
40 TABLET ORAL DAILY
Qty: 90 TABLET | Refills: 3 | Status: SHIPPED | OUTPATIENT
Start: 2021-08-16 | End: 2022-08-22

## 2021-08-16 RX ORDER — ACETAMINOPHEN 325 MG/1
325-650 TABLET ORAL EVERY 6 HOURS PRN
Qty: 60 TABLET | Refills: 4 | Status: SHIPPED | OUTPATIENT
Start: 2021-08-16 | End: 2022-08-29

## 2021-08-16 RX ORDER — ATORVASTATIN CALCIUM 40 MG/1
40 TABLET, FILM COATED ORAL DAILY
Qty: 90 TABLET | Refills: 3 | Status: SHIPPED | OUTPATIENT
Start: 2021-08-16 | End: 2022-08-22

## 2021-08-16 RX ORDER — LEVETIRACETAM 750 MG/1
750 TABLET ORAL 2 TIMES DAILY
Qty: 180 TABLET | Refills: 3 | Status: SHIPPED | OUTPATIENT
Start: 2021-08-16 | End: 2021-11-02

## 2021-08-16 RX ORDER — METOPROLOL SUCCINATE 200 MG/1
200 TABLET, EXTENDED RELEASE ORAL DAILY
Qty: 90 TABLET | Refills: 3 | Status: SHIPPED | OUTPATIENT
Start: 2021-08-16 | End: 2022-08-22

## 2021-08-16 RX ORDER — CETIRIZINE HYDROCHLORIDE 10 MG/1
10 TABLET ORAL DAILY
Qty: 90 TABLET | Refills: 3 | Status: SHIPPED | OUTPATIENT
Start: 2021-08-16 | End: 2022-08-22

## 2021-08-16 NOTE — PATIENT INSTRUCTIONS
Thank you for coming into the clinic today!  Here is what we discussed:    We will contact you regarding colonoscopy, mammogram, and Dexa scan    Refills of medications sent to [facility]    Take metamucil every day for loose stool    Take Yogurt every day and increase fiber intake to help with loose stool     Please follow up in 1 year for repeat medicare wellness exam    If you have any questions, please call the clinic at [phone].       MEDICARE PERSONAL PREVENTIVE SERVICES PLAN - IMMUNIZATIONS     Here are your recommended immunizations.  Take this home for your reference.                                                    IMMUNIZATIONS Description Recommend today?     Influenza (Flu shot) Prevents flu; should get every year Not in season yet   PCV 13 Pneumonia vaccination; you get it once Yes; Recommended and ordered.   PPSV 23 Second pneumonia vaccination; usually get it 1 year after PCV 13 No: is not indicated today.   Zoster (Shingles) Prevents shingles; you get it once  (Check with Part D insurance for coverage, must receive at a pharmacy, not clinic) Recommended, patient to follow up with pharmacy   Tetanus Prevents tetanus; once every 10 years No; is up to date.         Personalized Prevention Plan  You are due for the preventive services outlined below.  Your care team is available to assist you in scheduling these services.  If you have already completed any of these items, please share that information with your care team to update in your medical record.  Health Maintenance Due   Topic Date Due     Osteoporosis Screening  Never done     Discuss Advance Care Planning  Never done     Mammogram  Never done     Colorectal Cancer Screening  Never done     Hepatitis C Screening  Never done     Zoster (Shingles) Vaccine (1 of 2) Never done     FALL RISK ASSESSMENT  Never done     Pneumococcal Vaccine (1 of 1 - PPSV23) Never done

## 2021-08-16 NOTE — PROGRESS NOTES
Medicare Annual Wellness Visit         HPI     This 71 year old female presents as an established patient with Andreas Wilson who presents for an initial Medicare Wellness Exam.  Patient also reports diarrhea, no antibiotics in past year. Super soft stool, slightly explosive, intermittent, every few days or so depending. Takes antidiarrheal medication with good relief.     Wondering if she can drink alcohol.     Sury, daughter, present as well.        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     Gastroesophageal reflux disease without esophagitis     Hyperlipidemia     Tension headache       Past Medical History:   Diagnosis Date     A-fib (H)      Acid reflux      Arthritis      Asthma      Cerebral infarction (H)      Chronic osteoarthritis      Gastroesophageal reflux disease      GERD (gastroesophageal reflux disease)      Hay fever      Heart disease      Hyperlipidemia      Hypertension      Hypertension      Migraines      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  no recurrance since     Tension headache      TIA (transient ischemic attack) 04/2019    hospiltalized at [facility]     Uncomplicated asthma         Family History   Problem Relation Age of Onset     Diabetes Other      Cancer No family hx of      Diabetes Maternal Grandfather      Alcoholism Other          Past Surgical History:   Procedure Laterality Date     C TOTAL KNEE ARTHROPLASTY Right 12/4/2019    Procedure: RIGHT TOTAL KNEE ARTHROPLASTY;  Surgeon: Shahram Aguilar MD;  Location: [facility];  Service: Orthopedics     HYSTERECTOMY N/A 08/27/1975     TUBAL LIGATION         Reviewed no other  significant FH    Family History and past Medical History reviewed and it is unchanged/updated.       Review of Systems   Constitutional:   fevers, night sweats or unintentional weight change ?  NO      Eyes:   vision change, diplopia or red eyes?  NO      Ears, Nose, Mouth, Throat:   tinnitus or hearing change,  epistaxis or nasal discharge,  oral lesions, throat pain ?  Yes; mild tinnitus on R ear    Neck:   stiffness?  Yes; mild stiffness chronic and unchanged  Cardiovascular:   chest pain, palpitations, or pain with walking, orthopnea or PND?  NO   Breasts:  Any bumps or unusual discharge?     NO         Respiratory:   dyspnea, cough, shortness of breath or wheezing?  NO         GI:   nausea, vomiting, diarrhea or constipation,  abdominal pain ?  Yes; diarrhea  :   change in urine,  dysuria or hematuria,  sexual dysfunction ?  NO        Musculoskeletal:   joint or muscle pain or swelling?  NO            Skin:   concerning lesions or moles?  NO            Nervous System:   loss of strength or sensation,  numbness or tingling,  tremor,  dizziness,  headache?  NO   Endocrine/Homone:   polyuria or polydipsia,  temperature intolerance?  NO            Blood and Lymphnodes:   concerning bumps,  bleeding problems?  NO            Allergy:   environmental allergies?  Yes; hay allergy and takes cetirizine every day   Mental Health:   depression or anxiety,  sleep problems?  NO               Medical Care     Have you been to an ER or a hospital in the last year? No  What other specialists or organizations are involved in your medical care?  Orthopedist, neurologist  Current providers sharing in care for this patient include:  Patient Care Team:  Andreas Wilson MD as PCP - General (Student in organized health care education/training program)  Jero Saravia MD as MD (Neurology)  Jero Saravia MD as Assigned Neuroscience Provider  Andreas Wilson MD as Assigned PCP  Callie Neal APRN CNP as Assigned Heart and Vascular  Provider         Social History     Social History     Tobacco Use     Smoking status: Never Smoker     Smokeless tobacco: Never Used   Substance Use Topics     Alcohol use: Not Currently     Marital Status:   Who lives in your household? Herself  Does your home have any of the following safety concerns? Loose rugs in the hallway, no grab bars in the bathroom, no handrails on the stairs or have poorly lit areas?  No  Do you feel threatened or controlled by a partner, ex-partner or anyone in your life? No  Has anyone hurt you physically, for example by pushing, hitting, slapping or kicking you   or forcing you to have sex? No  Do you need help with the phone, transportation, shopping, preparing meals, housework, laundry, medications or managing money? Yes; her kids help her with activities.   Have you noticed any hearing difficulties? No      Risk Behaviors and Healthy Habits     How many servings of fruits and vegetables do you eat a day? No. But starting to eat healthier, incorporate fruits and vegetables.   How often do you exercise and what do you do? Walk almost every day, 15-30 minutes whenever she walks   Do you frequently ride without a seatbelt? No  Do you use tobacco?  No  Do you use any other drugs? No         Do you use alcohol?No    Today's PHQ-2 Score: 0    Sexual Health     Are you sexually active?  No   Have you had any sexually transmitted infections? No   Any sexual concerns? No     FOR WOMEN  What year did you stop having periods? In 40's   Any vaginal bleeding in the last year? No  Have you ever had an abnormal Pap smear? No    FUNCTIONAL ABILITY/SAFETY SCREENING     Fall Risk Assessment Today: Fallen 2 or more times in the past year?: No  Any fall with injury in the past year?: No    Hearing evaluation if done: No    EVALUATION OF COGNITIVE FUNCTION     Mood/affect:Normal  Appearance:Normal  Family member/caregiver input: Normal  Mini Cog Scoring  Not performed  Clock Draw Test result:   Normal    SCREENING FOR PREVENTION and EARLY DETECTION     ECG (if done)not performed    Corrected Visual acuity: L ***/20   R ***/20  {[facility] USPSTF LEVEL A MEDICARE:875045}  [facility]262276}    CV Risk based on Pooled Cohort Risk:  The ASCVD Risk score (Curtis MASTERSON Jr., et al., 2013) failed to calculate for the following reasons:    The patient has a prior MI or stroke diagnosis  {  Pooled Cohort Risk Calculator}    Advanced Directives: Discussed and patient desires to {[facility] ADVANCED DIRECTIVE:047272}.      Immunization History   Administered Date(s) Administered     COVID-19,PF,Moderna 01/28/2021, 02/25/2021     Pneumococcal 23 valent 08/16/2021     TDAP Vaccine (Boostrix) 05/21/2019       Reviewed Immunization Record Today  Pneumoccocal Vaccine: {[facility] YES/NO/DECLINES:642405}  Varicella Vaccine: {[facility] YES/NO/DECLINES:392588}  TDaP:{[facility] YES/NO/DECLINES:903484}         Physical Exam     Vitals: /72 (BP Location: Left arm, Cuff Size: Adult Small)   Pulse 69   Temp 98.3  F (36.8  C) (Oral)   Resp 16   Wt 160 lb 9.6 oz (72.8 kg)   SpO2 98%   BMI 27.57 kg/m    BMI= Body mass index is 27.57 kg/m .     GENERAL APPEARANCE: healthy, alert and no distress  EYES: Eyes grossly normal to inspection, PERRL and conjunctivae and sclerae normal  HENT: ear canals and TM's normal, nose and mouth without ulcers or lesions, oropharynx clear and oral mucous membranes moist  NECK: no adenopathy, no asymmetry, masses, or scars and thyroid normal to palpation  RESP: lungs clear to auscultation - no rales, rhonchi or wheezes  BREAST: normal without masses, tenderness or nipple discharge and no palpable axillary masses or adenopathy  CV: regular rate and rhythm, normal S1 S2, no S3 or S4, no murmur, click or rub, no peripheral edema and peripheral pulses strong  ABDOMEN: soft, nontender, no hepatosplenomegaly, no masses and bowel sounds normal  MS: no musculoskeletal defects are noted and gait is age appropriate  without ataxia  SKIN: no suspicious lesions or rashes  NEURO: Normal strength and tone, sensory exam grossly normal, mentation intact and speech normal  PSYCH: mentation appears normal and affect normal/bright        Assessment and Plan   initial   Medicare Wellness Exam  1. Health Care Maintenance: Normal Physical Exam    PLAN:  1. Reviewed [employer] form with patient and preventive service plan is as below., Dexa Scan ordered., Mammogram ordered., Colonoscopy ordered., Routine follow up in one year. and Pneucoccal vaccine   2. Zoster vaccine discussed today, pt to follow up with pharmacy regarding insurance coverage.   3. Medication refills    Galina was seen today for physical.    Diagnoses and all orders for this visit:    Need for vaccination  -     Pneumococcal vaccine 23 valent PPSV23  (Pneumovax) [42651]    Welcome to Medicare preventive visit  -     Adult Gastro Ref - Procedure Only; Future  -     psyllium (METAMUCIL SMOOTH TEXTURE) 28 % packet; Take one a day as needed for diarrhea    Encounter for Medicare annual wellness exam  -     Adult Gastro Ref - Procedure Only; Future  -     psyllium (METAMUCIL SMOOTH TEXTURE) 28 % packet; Take one a day as needed for diarrhea  -     acetaminophen (TYLENOL) 325 MG tablet; Take 1-2 tablets (325-650 mg) by mouth every 6 hours as needed for mild pain  -     cetirizine (ZYRTEC) 10 MG tablet; Take 1 tablet (10 mg) by mouth daily    Gastroesophageal reflux disease without esophagitis  -     famotidine (PEPCID) 20 MG tablet; Take 1 tablet (20 mg) by mouth 2 times daily    Partial symptomatic epilepsy with complex partial seizures, not intractable, without status epilepticus (H)  -     levETIRAcetam (KEPPRA) 750 MG tablet; Take 1 tablet (750 mg) by mouth 2 times daily    Hyperlipidemia, unspecified hyperlipidemia type  -     atorvastatin (LIPITOR) 40 MG tablet; Take 1 tablet (40 mg) by mouth daily    Benign essential hypertension  -     lisinopril  (ZESTRIL) 40 MG tablet; Take 1 tablet (40 mg) by mouth daily  -     amLODIPine (NORVASC) 10 MG tablet; Take 1 tablet (10 mg) by mouth daily    Paroxysmal atrial fibrillation (H)  -     lisinopril (ZESTRIL) 40 MG tablet; Take 1 tablet (40 mg) by mouth daily  -     metoprolol succinate ER (TOPROL-XL) 200 MG 24 hr tablet; Take 1 tablet (200 mg) by mouth daily  -     rivaroxaban ANTICOAGULANT (XARELTO ANTICOAGULANT) 20 MG TABS tablet; TAKE 1 TABLET (20 MG) BY MOUTH DAILY (WITH DINNER)    Seasonal allergic rhinitis, unspecified trigger  -     cetirizine (ZYRTEC) 10 MG tablet; Take 1 tablet (10 mg) by mouth daily    Postmenopausal status  -     Dexa hip/pelvis/spine*; Future    Encounter for screening mammogram for breast cancer  -     MA SCREENING DIGITAL BILAT; Future      Options for treatment and follow-up care were reviewed with the Galina Cunhabel and/or guardian engaged in the decision making process and verbalized understanding of the options discussed and agreed with the final plan.    SRINIVASA NIELSEN

## 2021-08-16 NOTE — PROGRESS NOTES
Preceptor Attestation:    I discussed the patient with the resident and evaluated the patient in person. I have verified the content of the note, which accurately reflects my assessment of the patient and the plan of care.   Supervising Physician:  Luis Valdes MD.

## 2021-08-16 NOTE — PROGRESS NOTES
65+ Annual Wellness Visit         HPI     This 71 year old female presents as an established patient of PCP Andreas Wilson who presents for a Initial Medicare Wellness Visit    Other issues patient wants to be addressed today:    Patient reports diarrhea for past 2 years or so that she attributes to her medications, no antibiotics in past year. Super soft stool, slightly explosive, intermittently every few days or so depending on whether she eats yogurt or not. Takes antidiarrheal medication with good relief, diarrhea improved with yogurt intake as well     Wondering if she can drink alcohol with her current medications.      Sury, daughter, present as well.      Follows with neurology for seizures, has not had any repeat seizures.     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     Gastroesophageal reflux disease without esophagitis     Hyperlipidemia     Tension headache       Past Medical History:   Diagnosis Date     A-fib (H)      Acid reflux      Arthritis      Asthma      Cerebral infarction (H)      Chronic osteoarthritis      Gastroesophageal reflux disease      GERD (gastroesophageal reflux disease)      Hay fever      Heart disease      Hyperlipidemia      Hypertension      Hypertension      Migraines      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  no recurrance since     Tension headache      TIA (transient ischemic attack) 04/2019    [facility]iltal[facility] at [facility]     Uncomplicated asthma         Family History   Problem Relation Age of Onset     Diabetes Other      Cancer No family hx  of      Diabetes Maternal Grandfather      Alcoholism Other          Problem List, Family History and past Medical History reviewed and unchanged/updated.  Visual Acuity:  Right Eye: 10/8 Left Eye: 10/10  Both Eyes: 10/8        FALL RISK ASSESSMENT 8/16/2021   Fallen 2 or more times in the past year? No   Any fall with injury in the past year? No            Health Risk Assessment / Review of Systems     Constitutional:   fevers, night sweats or unintentional weight change ?  NO      Eyes:   vision change, diplopia or red eyes?  NO      Ears, Nose, Mouth, Throat:   tinnitus or hearing change,  epistaxis or nasal discharge,  oral lesions, throat pain ?  Yes; mild tinnitus on R ear    Neck:   stiffness?  Yes; mild stiffness chronic and unchanged  Cardiovascular:   chest pain, palpitations, or pain with walking, orthopnea or PND?  NO   Breasts:  Any bumps or unusual discharge?     NO         Respiratory:   dyspnea, cough, shortness of breath or wheezing?  NO         GI:   nausea, vomiting, diarrhea or constipation,  abdominal pain ?  Yes; diarrhea  :   change in urine,  dysuria or hematuria,  sexual dysfunction ?  NO        Musculoskeletal:   joint or muscle pain or swelling?  NO            Skin:   concerning lesions or moles?  NO            Nervous System:   loss of strength or sensation,  numbness or tingling,  tremor,  dizziness,  headache?  NO   Endocrine/Homone:   polyuria or polydipsia,  temperature intolerance?  NO            Blood and Lymphnodes:   concerning bumps,  bleeding problems?  NO            Allergy:   environmental allergies?  Yes; hay allergy and takes cetirizine every day   Mental Health:   depression or anxiety,  sleep problems?  NO           PHQ-2 Score:   PHQ-2 ( 1999 Pfizer) 8/16/2021 11/18/2019   Q1: Little interest or pleasure in doing things 0 0   Q2: Feeling down, depressed or hopeless 0 0   PHQ-2 Score 0 0       PHQ-9 Score:   No flowsheet data found.         Medical Care     What other  specialists or organizations are involved in your medical care?  Neurology Dr. Saravia  Patient Care Team       Relationship Specialty Notifications Start End    Andreas Wilson MD PCP - General Student in organized health care education/training program  7/1/21     Phone: [phone] Fax: [phone]         [address] [facility] [address]    Jero Saravia MD MD Neurology  9/17/20     Phone: [phone] Fax: [phone]         [facility] [address] [facility] [address]    Jero Saravia MD Assigned Neuroscience Provider   10/23/20     Phone: [phone] Fax: [phone]         [facility] [place] [address] [facility] [address]    Andreas Wilson MD Assigned PCP   7/4/21     Phone: [phone] Fax: [phone]         [address] [place] [address]    Callie Neal APRN CNP Assigned Heart and Vascular Provider   7/16/21     Phone: [phone] Fax: [phone]         [address] [place] [address]                 Social History / Home Safety     Social History     Tobacco Use     Smoking status: Never Smoker     Smokeless tobacco: Never Used   Substance Use Topics     Alcohol use: Not Currently     Marital Status:  Who lives in your household?     Does your home have any of the following safety concerns? Loose rugs in the hallway, no grab bars in the bathroom, no handrails on the stairs or have poorly lit areas?  No     Do you feel threatened or controlled by a partner, ex-partner or anyone in your life? No     Has anyone hurt you physically, for example by pushing, hitting, slapping or kicking you   or forcing you to have sex? No          Functional Status     Do you need help with dressing yourself, bathing, or walking?No     Do you need help with the phone, transportation, shopping, preparing meals, housework, laundry, medications or managing money?No       Risk Behaviors and Healthy Habits     History   Smoking Status     Never Smoker   Smokeless Tobacco      "Never Used     How many servings of fruits and vegetables do you eat a day? 2    Exercise: 2-3 days/week for an average of  walking and knee exercise     Do you frequently drive without a seatbelt? No     Do you use any other drugs? No         Do you use alcohol?No      Frailty Assessment            1. By yourself and note using aids, do you have difficulty walking up 10 steps without resting?  No  (1 for Yes, 0 for No)    2. By yourself and not using mobility aids, do you have any difficulty walking several hundred yards?  YES  (1 for Yes, 0 for No)    3. Have you lost 10 or more pounds unintentionally in the previous year? No  (If \"Yes\" and >5% weight loss, then score 1.  Score 0, if <5% weight loss or \"No\" weight loss)    4. How much of the times during the past 3 weeks did you feel tired? 3. Some of the time (\"1\" or \"2\" are scored 1, others 0)    5.  A doctor told the patient they had the following illnesses:  High blood pressure  Stroke  Asthma (0-4 = score 0, 5-11= score 1)          Frailty Assessment              1. (1 for Yes, 0 for No)    2. (1 for Yes, 0 for No)    3. (If \"Yes\" and >5% weight loss, then score 1.  Score 0, if <5% weight loss or \"No\" weight loss)    4. (\"1\" or \"2\" are scored 1, others 0)    5. Count number of illnesses. (0-4 = score 0, 5-11= score 1)    Total score: 1; prefrail    Frailty screen score (3-5 = frail; consider interdisciplinary assessment and care.  1-2 = prefrail; at risk for adverse health events; 0 = robust)      EVALUATION OF COGNITIVE FUNCTION     Mood/affect:Normal  Appearance:Normal  Family member/caregiver input: Normal    Mini Cog Scoring     Clock Draw Test result:  Normal     Cognitive screen is:Negative      Other Assessments     CV Risk based on Pooled Cohort Risk (consider assessing every 4-6 years; consider statin in patients with 10-yr risk > 7.5%):   The ASCVD Risk score (Curtis MASTERSON Jr., et al., 2013) failed to calculate for the following reasons:    The patient " has a prior MI or stroke diagnosis    Advance Directives: Discussed with patient and family as appropriate.  Has patient completed advance directives and/or a living will?  No, only in progress of obtaining advanced directive.  Patient wishes are known by daughter.      Immunization History   Administered Date(s) Administered     COVID-19,PF,Moderna 01/28/2021, 02/25/2021     Pneumococcal 23 valent 08/16/2021     TDAP Vaccine (Boostrix) 05/21/2019     Reviewed Immunization Record Today         Physical Exam     Vitals: /72 (BP Location: Left arm, Cuff Size: Adult Small)   Pulse 69   Temp 98.3  F (36.8  C) (Oral)   Resp 16   Wt 72.8 kg (160 lb 9.6 oz)   SpO2 98%   BMI 27.57 kg/m    BMI= Body mass index is 27.57 kg/m .  EXAM:  Constitutional: healthy, alert and no distress   Cardiovascular: negative, PMI normal. No lifts, heaves, or thrills. Irregular heart rhythm, regular rate. No murmurs, clicks gallops or rub  Respiratory: negative, Percussion normal. Lungs clear to auscultation bilaterally, normal work of breathing  Psychiatric: mentation appears normal and affect normal/bright  Head: Normocephalic. No masses, lesions, tenderness or abnormalities  Neck: Neck supple. No adenopathy. Thyroid symmetric, normal size,, Carotids without bruits.  Abdomen: Abdomen soft, non-tender. BS normal. No masses, organomegaly  NEURO: Gait normal. Reflexes normal and symmetric. Sensation grossly WNL.  SKIN: no suspicious lesions or rashes        Assessment and Plan       Reviewed Preventive Services and Plan form with patient as specified in Patient Instructions.      Positive findings on assessment:    Galina was seen today for physical.    Diagnoses and all orders for this visit:    Need for vaccination  -     Pneumococcal vaccine 23 valent PPSV23  (Pneumovax) [88016]    Welcome to Medicare preventive visit  -     Adult Gastro Ref - Procedure Only; Future  -     psyllium (METAMUCIL SMOOTH TEXTURE) 28 % packet; Take one  a day as needed for diarrhea    Encounter for Medicare annual wellness exam  Due for colonoscopy, diarrhea improved with yogurt and will encourage Metamucil and continue oral intake.  No medication changes today, will refill medications as indicated.  Preventative care includes obtaining Pneumovax today, will order DEXA and mammogram.  -     Adult Gastro Ref - Procedure Only; Future  -     psyllium (METAMUCIL SMOOTH TEXTURE) 28 % packet; Take one a day as needed for diarrhea  -     acetaminophen (TYLENOL) 325 MG tablet; Take 1-2 tablets (325-650 mg) by mouth every 6 hours as needed for mild pain  -     cetirizine (ZYRTEC) 10 MG tablet; Take 1 tablet (10 mg) by mouth daily    Gastroesophageal reflux disease without esophagitis  -     famotidine (PEPCID) 20 MG tablet; Take 1 tablet (20 mg) by mouth 2 times daily    Partial symptomatic epilepsy with complex partial seizures, not intractable, without status epilepticus (H)  -     levETIRAcetam (KEPPRA) 750 MG tablet; Take 1 tablet (750 mg) by mouth 2 times daily    Hyperlipidemia, unspecified hyperlipidemia type  -     atorvastatin (LIPITOR) 40 MG tablet; Take 1 tablet (40 mg) by mouth daily    Benign essential hypertension  -     lisinopril (ZESTRIL) 40 MG tablet; Take 1 tablet (40 mg) by mouth daily  -     amLODIPine (NORVASC) 10 MG tablet; Take 1 tablet (10 mg) by mouth daily    Paroxysmal atrial fibrillation (H)  -     lisinopril (ZESTRIL) 40 MG tablet; Take 1 tablet (40 mg) by mouth daily  -     metoprolol succinate ER (TOPROL-XL) 200 MG 24 hr tablet; Take 1 tablet (200 mg) by mouth daily  -     rivaroxaban ANTICOAGULANT (XARELTO ANTICOAGULANT) 20 MG TABS tablet; TAKE 1 TABLET (20 MG) BY MOUTH DAILY (WITH DINNER)    Seasonal allergic rhinitis, unspecified trigger  -     cetirizine (ZYRTEC) 10 MG tablet; Take 1 tablet (10 mg) by mouth daily    Postmenopausal status  -     Dexa hip/pelvis/spine*; Future    Encounter for screening mammogram for breast cancer   -      MA SCREENING DIGITAL BILAT; Future    Options for treatment and follow-up care were reviewed with the Galina Cunhabel and/or guardian engaged in the decision making process and verbalized understanding of the options discussed and agreed with the final plan.    Andreas Wilson, DO

## 2021-09-08 ENCOUNTER — ANCILLARY PROCEDURE (OUTPATIENT)
Dept: BONE DENSITY | Facility: CLINIC | Age: 71
End: 2021-09-08
Attending: FAMILY MEDICINE
Payer: COMMERCIAL

## 2021-09-08 ENCOUNTER — HOSPITAL ENCOUNTER (OUTPATIENT)
Dept: MAMMOGRAPHY | Facility: CLINIC | Age: 71
Discharge: HOME | End: 2021-09-08
Attending: FAMILY MEDICINE | Admitting: FAMILY MEDICINE
Payer: COMMERCIAL

## 2021-09-08 DIAGNOSIS — Z78.0 POSTMENOPAUSAL STATUS: ICD-10-CM

## 2021-09-08 DIAGNOSIS — Z12.31 ENCOUNTER FOR SCREENING MAMMOGRAM FOR BREAST CANCER: ICD-10-CM

## 2021-09-08 PROCEDURE — 77067 SCR MAMMO BI INCL CAD: CPT

## 2021-09-08 PROCEDURE — 77080 DXA BONE DENSITY AXIAL: CPT | Mod: TC | Performed by: RADIOLOGY

## 2021-10-10 ENCOUNTER — HEALTH MAINTENANCE LETTER (OUTPATIENT)
Age: 71
End: 2021-10-10

## 2021-11-02 ENCOUNTER — LAB (OUTPATIENT)
Dept: LAB | Facility: HOSPITAL | Age: 71
End: 2021-11-02
Payer: COMMERCIAL

## 2021-11-02 ENCOUNTER — OFFICE VISIT (OUTPATIENT)
Dept: NEUROLOGY | Facility: CLINIC | Age: 71
End: 2021-11-02
Payer: COMMERCIAL

## 2021-11-02 VITALS
DIASTOLIC BLOOD PRESSURE: 71 MMHG | BODY MASS INDEX: 25.78 KG/M2 | HEART RATE: 60 BPM | HEIGHT: 64 IN | SYSTOLIC BLOOD PRESSURE: 138 MMHG | WEIGHT: 151 LBS

## 2021-11-02 DIAGNOSIS — G40.209 PARTIAL SYMPTOMATIC EPILEPSY WITH COMPLEX PARTIAL SEIZURES, NOT INTRACTABLE, WITHOUT STATUS EPILEPTICUS (H): ICD-10-CM

## 2021-11-02 PROBLEM — I10 ACCELERATED HYPERTENSION: Status: ACTIVE | Noted: 2021-11-02

## 2021-11-02 PROBLEM — E78.5 DYSLIPIDEMIA: Status: ACTIVE | Noted: 2019-05-31

## 2021-11-02 LAB
ANION GAP SERPL CALCULATED.3IONS-SCNC: 10 MMOL/L (ref 5–18)
CHLORIDE BLD-SCNC: 110 MMOL/L (ref 98–107)
CO2 SERPL-SCNC: 20 MMOL/L (ref 22–31)
LEVETIRACETAM (KEPPRA): 41.1 UG/ML (ref 6–46)
POTASSIUM BLD-SCNC: 3.8 MMOL/L (ref 3.5–5)
SODIUM SERPL-SCNC: 140 MMOL/L (ref 136–145)

## 2021-11-02 PROCEDURE — 36415 COLL VENOUS BLD VENIPUNCTURE: CPT

## 2021-11-02 PROCEDURE — 80177 DRUG SCRN QUAN LEVETIRACETAM: CPT

## 2021-11-02 PROCEDURE — 99214 OFFICE O/P EST MOD 30 MIN: CPT | Performed by: PSYCHIATRY & NEUROLOGY

## 2021-11-02 PROCEDURE — 82374 ASSAY BLOOD CARBON DIOXIDE: CPT

## 2021-11-02 RX ORDER — LEVETIRACETAM 750 MG/1
750 TABLET ORAL 2 TIMES DAILY
Qty: 180 TABLET | Refills: 3 | Status: SHIPPED | OUTPATIENT
Start: 2021-11-02 | End: 2022-08-22

## 2021-11-02 ASSESSMENT — MIFFLIN-ST. JEOR: SCORE: 1184.93

## 2021-11-02 NOTE — LETTER
"    2021         RE: Galina Barnes  [address]  [facility] [address]        Dear Colleague,    Thank you for referring your patient, Galina Barnes, to the [facility] [place]. Please see a copy of my visit note below.    NEUROLOGY FOLLOW UP VISIT  NOTE       [facility] [place]  [address] [place] [address]  Tel: [phone]  Fax: [phone]  [email or web address]     Galina Barnes,  1950, MRN [id number]  PCP: Andreas Wilson  Date: 2021      ASSESSMENT & PLAN     Visit Diagnosis  1. Partial symptomatic epilepsy with complex partial seizures, not intractable, without status epilepticus (H)     Complex partial seizure  Pleasant 71-year-old female with history of atrial fibrillation, hypertension who was admitted to [facility] in May 2019 with left basal ganglia infarct.  Part of work-up included EEG that showed left temporal sharp activity.  She was started on Keppra and since then has remained symptom-free.  I refilled her prescription, gave her 30-day supply with 11 refills.  I am checking Keppra level and electrolyte panel.  Regular follow-up will be in 1 year.     Cognitive decline  During a previous visit daughter had reported that patient was having some memory difficulty.  She had MRI of the brain that showed multiple areas of hemosiderin blooming signal within bilateral basal ganglia, thalami, cerebral and cerebellar hemisphere and brainstem that raise the possibility of vascular dementia.  During hospitalization she scored 22/30 on MO CA but subsequently she scored 26/30.  She had lab work of common causes of dementia that was normal.  Patient feels her memory has improved and is keeping herself busy by doing some brain exercises and is not interested in adding any medication.  She claims she is the \"memory person\" in the neighborhood as people come to her if they forget something.  With her MRI " findings I am concerned about the possibility of vascular dementia and I have asked daughter to keep an eye on her cognitive issues and if any progression will consider Aricept.  Follow-up will be in 1 year    Thank you again for this referral, please feel free to contact me if you have any questions.    Jero Saravia MD  [facility]  (Formerly, [facility]     HISTORY OF PRESENT ILLNESS     Patient is 71-year-old female with history of atrial fibrillation, HTN who is been followed in our clinic for complex partial seizure.  She was admitted to the hospital in May 2019 with acute onset of dizziness.  MRI of the brain showed a left basal ganglia infarct with multiple areas of hemosiderin blooming signal within the bilateral basal ganglia, bilateral thalamus, cerebral and cerebellar hemisphere.  Echocardiogram showed normal ejection fraction.  EEG showed left temporal sharp activity suggesting a low threshold for seizures.  She was started on Keppra.  During hospitalization she scored low on Dwarf cognitive assessment but subsequently her score was normal.  She denies any seizures since her last visit.  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     Gastroesophageal reflux disease without esophagitis K21.9     Hyperlipidemia E78.5     Tension headache G44.209     Dyslipidemia E78.5         PAST MEDICAL & SURGICAL HISTORY     Past Medical History:   Patient  has a past medical  history of A-fib (H), Acid reflux, Arthritis, Asthma, Cerebral infarction (H), Chronic osteoarthritis, Gastroesophageal reflux disease, GERD (gastroesophageal reflux disease), Hay fever, Heart disease, Hyperlipidemia, Hypertension, Hypertension, Migraines, Multiple 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    Surgical History:  She  has a past surgical history that includes Hysterectomy (N/A, 08/27/1975); tubal ligation; and TOTAL KNEE ARTHROPLASTY (Right, 12/4/2019).     SOCIAL HISTORY     Reviewed, and she  reports that she has never smoked. She has never used smokeless tobacco. She reports previous alcohol use. She reports that she does not use drugs.     FAMILY HISTORY     Reviewed, and family history includes Alcoholism in an other family member; Diabetes in her maternal grandfather and another family member.     ALLERGIES     No Known Allergies      REVIEW OF SYSTEMS     A 12 point review of system was performed and was negative except as outlined in the history of present illness.     HOME MEDICATIONS     Current Outpatient Rx   Medication Sig Dispense Refill     acetaminophen (TYLENOL) 325 MG tablet Take 1-2 tablets (325-650 mg) by mouth every 6 hours as needed for mild pain 60 tablet 4     amLODIPine (NORVASC) 10 MG tablet Take 1 tablet (10 mg) by mouth daily 90 tablet 3     atorvastatin (LIPITOR) 40 MG tablet Take 1 tablet (40 mg) by mouth daily 90 tablet 3     calcium carbonate (TUMS) 500 MG chewable tablet Take 1 tablet (500 mg) by mouth 2 times daily as needed for heartburn       cetirizine (ZYRTEC) 10 MG tablet Take 1 tablet (10 mg) by mouth daily 90 tablet 3     famotidine (PEPCID) 20 MG tablet Take 1 tablet (20 mg) by mouth 2 times daily 180 tablet 3     levETIRAcetam (KEPPRA) 750 MG tablet Take 1 tablet (750 mg) by mouth 2 times  "daily 180 tablet 3     lisinopril (ZESTRIL) 40 MG tablet Take 1 tablet (40 mg) by mouth daily 90 tablet 3     metoprolol succinate ER (TOPROL-XL) 200 MG 24 hr tablet Take 1 tablet (200 mg) by mouth daily 90 tablet 3     psyllium (METAMUCIL SMOOTH TEXTURE) 28 % packet Take one a day as needed for diarrhea 30 packet 11     rivaroxaban ANTICOAGULANT (XARELTO ANTICOAGULANT) 20 MG TABS tablet TAKE 1 TABLET (20 MG) BY MOUTH DAILY (WITH DINNER) 90 tablet 3         PHYSICAL EXAM     Vital signs  /71 (BP Location: Left arm, Patient Position: Sitting)   Pulse 60   Ht 1.626 m (5' 4\")   Wt 68.5 kg (151 lb)   BMI 25.92 kg/m      Weight:   151 lbs 0 oz    Patient is alert and oriented x4 in no acute distress. Vital signs were reviewed and are documented in electronic medical record. Neck was supple, no carotid bruits, thyromegaly, JVD, or lymphadenopathy was noted.   NEUROLOGY EXAM:    Patient s speech was normal with no aphasia or dysarthria. Mentation, and affect were also normal.     Funduscopic exam was normal, with normal cup to disc ratio. Cranial nerves II -XII were intact.     Patient had normal mass, tone and motor strength was 5/5 in all extremities without pronator drift.     Sensation was intact to light touch, pinprick, and vibratory sensation.     Reflexes were 1+ symmetrical with downgoing toes.     No dysmetria noted on FNF or HKS. Romberg was negative.    Gait testing was normal. Able to walk on toes/heels.      DIAGNOSTIC STUDIES     PERTINENT RADIOLOGY  Following imaging studies were reviewed:     HEAD MRI: 5/21/19  1. There is a possible punctate focus of acute or early subacute cerebral infarction involving the anterior left putamen. No associated mass effect.  2. There are innumerable foci of hemosiderin blooming within the bilateral basal ganglia, thalami, cerebral and cerebellar hemispheres and brainstem. These are of indeterminate nature but may reflect changes relating to chronic hypertension " an amyloid   angiopathy.  3. Underlying multifocal chronic lacunar infarcts in the basal ganglia, thalami, and anterior corpus callosum.  4. Underlying advanced presumed chronic small vessel ischemic changes.    HEAD MRA:   1. No major intracranial flow-limiting arterial stenosis or occlusion.  2. 2.5 mm aneurysm arising at the junction of the left A1 and A2 segments.    NECK MRA:  1. No significant stenosis in the neck vessels based on NASCET criteria.  2. No evidence for dissection or pseudoaneurysm.    EEG 5/23/19  This is an abnormal EEG due to left hemispheric sharp discharges in the temporal head region that suggest a low threshold for focal seizure.  Underlying background is normal.    ECHOCARDIOGRAM 5/23/2019  Normal left ventricular size and systolic performance with a visually estimated ejection fraction of 60%.   There is mild aortic insufficiency.   Normal right ventricular size and systolic performance.   There is mild left atrial enlargement     PERTINENT LABS  Following labs were reviewed:  No visits with results within 3 Month(s) from this visit.   Latest known visit with results is:   Office Visit on 11/18/2019   Component Date Value     Urea Nitrogen 11/18/2019 22.4*     Calcium 11/18/2019 9.6      Chloride 11/18/2019 105.4      Carbon Dioxide 11/18/2019 26.9      Creatinine 11/18/2019 0.8      Glucose 11/18/2019 141.6*     Potassium 11/18/2019 4.2      Sodium 11/18/2019 139.7      GFR Estimate 11/18/2019 77.7      GFR Estimate If Black 11/18/2019 >90      WBC 11/18/2019 8.6      RBC 11/18/2019 3.7*     Hemoglobin 11/18/2019 12.1      Hematocrit 11/18/2019 38.4      MCV 11/18/2019 103.5*     MCH 11/18/2019 32.6      MCHC 11/18/2019 31.5*     Platelets 11/18/2019 318.0          Total time spent for face to face visit, reviewing labs/imaging studies, counseling and coordination of care was: 30 Minutes spent on the date of the encounter doing chart review, review of outside records, review of test  results, interpretation of tests, patient visit and documentation       This note was dictated using voice recognition software.  Any grammatical or context distortions are unintentional and inherent to the software.    Orders Placed This Encounter   Procedures     Keppra (Levetiracetam) Level     Electrolyte panel      New Prescriptions    No medications on file     Modified Medications    Modified Medication Previous Medication    LEVETIRACETAM (KEPPRA) 750 MG TABLET levETIRAcetam (KEPPRA) 750 MG tablet       Take 1 tablet (750 mg) by mouth 2 times daily    Take 1 tablet (750 mg) by mouth 2 times daily                     Again, thank you for allowing me to participate in the care of your patient.        Sincerely,        Jero Saravia MD

## 2021-11-02 NOTE — PATIENT INSTRUCTIONS
Patient Education     Discharge Instructions for Epilepsy  You have been diagnosed with epilepsy, a disorder of recurring seizures. When you have a seizure, an electrical disturbance happens in your brain. There are different kinds of seizures, and each person may have one or many types of seizures. Here are some guidelines for you and your family.  If you have a seizure  Ask friends and family members to learn seizure management. Also, tell them to do the following if you have a seizure:    Clear the area to prevent injury.    Position you on a flat, carpeted surface, if possible.    Don t try to restrain you.    Don t put anything in your mouth.    Turn you onto your side if you start to vomit.    Keep track of the date and time the seizure started, how long it lasted, whether or not you lost consciousness, a description of your body movements, what provoked the seizure (if known), and any injuries you suffered. Using a watch may help keep correct time of events.      Stay with you until you regain consciousness.    Call 911 if the seizure is longer than 5 minutes, if there are multiple seizures, or if you don't start to wake up after the seizure stops.    Activities  Following are some things to consider:    Enjoy your normal activities. Most people with epilepsy lead normal lives.    Don't do hazardous activities, such as mountain climbing or scuba diving. A seizure under these conditions could lead to a fatal accident.    Don't swim alone or participate in other similar activities without others nearby.    Ask your healthcare provider about any restrictions on driving or other activities.    Check with your state department of public safety to learn whether there are any driving limitations based on your condition.  Other home care  Other considerations:    Take your medicine exactly as directed. Skipping doses can affect the way your body handles the medicine, which could cause you to have a  seizure.    Don t drink alcohol or use any medicine without talking with your healthcare provider first.    Seizure medicines may interact with other medicines. Make sure all of your healthcare providers have a list of all your medicines.     Birth control pills may not work as effectively when taking seizure medicines. Ask your healthcare provider if a change in birth control is needed.     Wear a medical alert pendant or bracelet that alerts others to your condition, especially if you are allergic to seizure medicine.    Join a local support group. Ask your healthcare provider for names and phone numbers.  Call 911  Tell your family members or friends to call 911 right away if you have:    Seizure that lasts more than 5 minutes    Multiple seizures in a row    No recovery of consciousness after the seizure stops  When to call your healthcare provider  Have family members or friends call your healthcare provider right away if you have:    Seizures that are getting longer and worse    Seizures that are different from those you ve had in the past    Seizures strong enough to cause injury    Skin rash    Fever of 100.4 F (38 C) or higher, or as directed by your healthcare provider  Elena last reviewed this educational content on 4/1/2018 2000-2021 [employer]. All rights reserved. This information is not intended as a substitute for professional medical care. Always follow your healthcare professional's instructions.

## 2021-11-02 NOTE — PROGRESS NOTES
"NEUROLOGY FOLLOW UP VISIT  NOTE       [facility] NEUROLOG[facility] [place]  [address] [place] [address]  Tel: [phone]  Fax: [phone]  [email or web address]     Galina Barnes,  1950, MRN [id number]  PCP: Andreas Wilson  Date: 2021      ASSESSMENT & PLAN     Visit Diagnosis  1. Partial symptomatic epilepsy with complex partial seizures, not intractable, without status epilepticus (H)     Complex partial seizure  Pleasant 71-year-old female with history of atrial fibrillation, hypertension who was admitted to [facility] in May 2019 with left basal ganglia infarct.  Part of work-up included EEG that showed left temporal sharp activity.  She was started on Keppra and since then has remained symptom-free.  I refilled her prescription, gave her 30-day supply with 11 refills.  I am checking Keppra level and electrolyte panel.  Regular follow-up will be in 1 year.     Cognitive decline  During a previous visit daughter had reported that patient was having some memory difficulty.  She had MRI of the brain that showed multiple areas of hemosiderin blooming signal within bilateral basal ganglia, thalami, cerebral and cerebellar hemisphere and brainstem that raise the possibility of vascular dementia.  During hospitalization she scored 22/30 on MO CA but subsequently she scored 26/30.  She had lab work of common causes of dementia that was normal.  Patient feels her memory has improved and is keeping herself busy by doing some brain exercises and is not interested in adding any medication.  She claims she is the \"memory person\" in the neighborhood as people come to her if they forget something.  With her MRI findings I am concerned about the possibility of vascular dementia and I have asked daughter to keep an eye on her cognitive issues and if any progression will consider Aricept.  Follow-up will be in 1 year    Thank you again for this referral, please feel free to contact me " if you have any questions.    Jero Saravia MD  [facility]  (Formerly, [facility]     HISTORY OF PRESENT ILLNESS     Patient is 71-year-old female with history of atrial fibrillation, HTN who is been followed in our clinic for complex partial seizure.  She was admitted to the hospital in May 2019 with acute onset of dizziness.  MRI of the brain showed a left basal ganglia infarct with multiple areas of hemosiderin blooming signal within the bilateral basal ganglia, bilateral thalamus, cerebral and cerebellar hemisphere.  Echocardiogram showed normal ejection fraction.  EEG showed left temporal sharp activity suggesting a low threshold for seizures.  She was started on Keppra.  During hospitalization she scored low on [place] cognitive assessment but subsequently her score was normal.  She denies any seizures since her last visit.  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     Gastroesophageal reflux disease without esophagitis K21.9     Hyperlipidemia E78.5     Tension headache G44.209     Dyslipidemia E78.5         PAST MEDICAL & SURGICAL HISTORY     Past Medical History:   Patient  has a past medical history of A-fib (H), Acid reflux, Arthritis, Asthma, Cerebral infarction (H), Chronic osteoarthritis, Gastroesophageal reflux disease, GERD (gastroesophageal reflux disease), Hay fever, Heart disease, Hyperlipidemia, Hypertension, Hypertension, Migraines, Multiple hemosiderin  deposits in brain, Nonintractable epilepsy with complex partial seizures (H), Seizures (H), Seizures (H) (2019), Tension headache, TIA (transient ischemic attack) (04/2019), and Uncomplicated asthma.    Surgical History:  She  has a past surgical history that includes Hysterectomy (N/A, 08/27/1975); tubal ligation; and TOTAL KNEE ARTHROPLASTY (Right, 12/4/2019).     SOCIAL HISTORY     Reviewed, and she  reports that she has never smoked. She has never used smokeless tobacco. She reports previous alcohol use. She reports that she does not use drugs.     FAMILY HISTORY     Reviewed, and family history includes Alcoholism in an other family member; Diabetes in her maternal grandfather and another family member.     ALLERGIES     No Known Allergies      REVIEW OF SYSTEMS     A 12 point review of system was performed and was negative except as outlined in the history of present illness.     HOME MEDICATIONS     Current Outpatient Rx   Medication Sig Dispense Refill     acetaminophen (TYLENOL) 325 MG tablet Take 1-2 tablets (325-650 mg) by mouth every 6 hours as needed for mild pain 60 tablet 4     amLODIPine (NORVASC) 10 MG tablet Take 1 tablet (10 mg) by mouth daily 90 tablet 3     atorvastatin (LIPITOR) 40 MG tablet Take 1 tablet (40 mg) by mouth daily 90 tablet 3     calcium carbonate (TUMS) 500 MG chewable tablet Take 1 tablet (500 mg) by mouth 2 times daily as needed for heartburn       cetirizine (ZYRTEC) 10 MG tablet Take 1 tablet (10 mg) by mouth daily 90 tablet 3     famotidine (PEPCID) 20 MG tablet Take 1 tablet (20 mg) by mouth 2 times daily 180 tablet 3     levETIRAcetam (KEPPRA) 750 MG tablet Take 1 tablet (750 mg) by mouth 2 times daily 180 tablet 3     lisinopril (ZESTRIL) 40 MG tablet Take 1 tablet (40 mg) by mouth daily 90 tablet 3     metoprolol succinate ER (TOPROL-XL) 200 MG 24 hr tablet Take 1 tablet (200 mg) by mouth daily 90 tablet 3     psyllium (METAMUCIL SMOOTH TEXTURE) 28 % packet Take one a  "day as needed for diarrhea 30 packet 11     rivaroxaban ANTICOAGULANT (XARELTO ANTICOAGULANT) 20 MG TABS tablet TAKE 1 TABLET (20 MG) BY MOUTH DAILY (WITH DINNER) 90 tablet 3         PHYSICAL EXAM     Vital signs  /71 (BP Location: Left arm, Patient Position: Sitting)   Pulse 60   Ht 1.626 m (5' 4\")   Wt 68.5 kg (151 lb)   BMI 25.92 kg/m      Weight:   151 lbs 0 oz    Patient is alert and oriented x4 in no acute distress. Vital signs were reviewed and are documented in electronic medical record. Neck was supple, no carotid bruits, thyromegaly, JVD, or lymphadenopathy was noted.   NEUROLOGY EXAM:    Patient s speech was normal with no aphasia or dysarthria. Mentation, and affect were also normal.     Funduscopic exam was normal, with normal cup to disc ratio. Cranial nerves II -XII were intact.     Patient had normal mass, tone and motor strength was 5/5 in all extremities without pronator drift.     Sensation was intact to light touch, pinprick, and vibratory sensation.     Reflexes were 1+ symmetrical with downgoing toes.     No dysmetria noted on FNF or HKS. Romberg was negative.    Gait testing was normal. Able to walk on toes/heels.      DIAGNOSTIC STUDIES     PERTINENT RADIOLOGY  Following imaging studies were reviewed:     HEAD MRI: 5/21/19  1. There is a possible punctate focus of acute or early subacute cerebral infarction involving the anterior left putamen. No associated mass effect.  2. There are innumerable foci of hemosiderin blooming within the bilateral basal ganglia, thalami, cerebral and cerebellar hemispheres and brainstem. These are of indeterminate nature but may reflect changes relating to chronic hypertension an amyloid   angiopathy.  3. Underlying multifocal chronic lacunar infarcts in the basal ganglia, thalami, and anterior corpus callosum.  4. Underlying advanced presumed chronic small vessel ischemic changes.    HEAD MRA:   1. No major intracranial flow-limiting arterial stenosis " or occlusion.  2. 2.5 mm aneurysm arising at the junction of the left A1 and A2 segments.    NECK MRA:  1. No significant stenosis in the neck vessels based on NASCET criteria.  2. No evidence for dissection or pseudoaneurysm.    EEG 5/23/19  This is an abnormal EEG due to left hemispheric sharp discharges in the temporal head region that suggest a low threshold for focal seizure.  Underlying background is normal.    ECHOCARDIOGRAM 5/23/2019  Normal left ventricular size and systolic performance with a visually estimated ejection fraction of 60%.   There is mild aortic insufficiency.   Normal right ventricular size and systolic performance.   There is mild left atrial enlargement     PERTINENT LABS  Following labs were reviewed:  No visits with results within 3 Month(s) from this visit.   Latest known visit with results is:   Office Visit on 11/18/2019   Component Date Value     Urea Nitrogen 11/18/2019 22.4*     Calcium 11/18/2019 9.6      Chloride 11/18/2019 105.4      Carbon Dioxide 11/18/2019 26.9      Creatinine 11/18/2019 0.8      Glucose 11/18/2019 141.6*     Potassium 11/18/2019 4.2      Sodium 11/18/2019 139.7      GFR Estimate 11/18/2019 77.7      GFR Estimate If Black 11/18/2019 >90      WBC 11/18/2019 8.6      RBC 11/18/2019 3.7*     Hemoglobin 11/18/2019 12.1      Hematocrit 11/18/2019 38.4      MCV 11/18/2019 103.5*     MCH 11/18/2019 32.6      MCHC 11/18/2019 31.5*     Platelets 11/18/2019 318.0          Total time spent for face to face visit, reviewing labs/imaging studies, counseling and coordination of care was: 30 Minutes spent on the date of the encounter doing chart review, review of outside records, review of test results, interpretation of tests, patient visit and documentation       This note was dictated using voice recognition software.  Any grammatical or context distortions are unintentional and inherent to the software.    Orders Placed This Encounter   Procedures     Keppra  (Levetiracetam) Level     Electrolyte panel      New Prescriptions    No medications on file     Modified Medications    Modified Medication Previous Medication    LEVETIRACETAM (KEPPRA) 750 MG TABLET levETIRAcetam (KEPPRA) 750 MG tablet       Take 1 tablet (750 mg) by mouth 2 times daily    Take 1 tablet (750 mg) by mouth 2 times daily

## 2021-11-02 NOTE — NURSING NOTE
Chief Complaint   Patient presents with     Seizures     Annual follow up     Lynette Corley CMA on 11/2/2021 at 2:21 PM

## 2022-08-08 ENCOUNTER — TELEPHONE (OUTPATIENT)
Dept: FAMILY MEDICINE | Facility: CLINIC | Age: 72
End: 2022-08-08

## 2022-08-08 DIAGNOSIS — Z00.00 WELCOME TO MEDICARE PREVENTIVE VISIT: ICD-10-CM

## 2022-08-08 DIAGNOSIS — E78.5 HYPERLIPIDEMIA, UNSPECIFIED HYPERLIPIDEMIA TYPE: ICD-10-CM

## 2022-08-08 DIAGNOSIS — I10 BENIGN ESSENTIAL HYPERTENSION: ICD-10-CM

## 2022-08-08 DIAGNOSIS — G40.209 PARTIAL SYMPTOMATIC EPILEPSY WITH COMPLEX PARTIAL SEIZURES, NOT INTRACTABLE, WITHOUT STATUS EPILEPTICUS (H): ICD-10-CM

## 2022-08-08 DIAGNOSIS — J30.2 SEASONAL ALLERGIC RHINITIS, UNSPECIFIED TRIGGER: ICD-10-CM

## 2022-08-08 DIAGNOSIS — K21.9 GASTROESOPHAGEAL REFLUX DISEASE WITHOUT ESOPHAGITIS: ICD-10-CM

## 2022-08-08 DIAGNOSIS — I48.0 PAROXYSMAL ATRIAL FIBRILLATION (H): ICD-10-CM

## 2022-08-08 DIAGNOSIS — Z00.00 ENCOUNTER FOR MEDICARE ANNUAL WELLNESS EXAM: ICD-10-CM

## 2022-08-08 NOTE — TELEPHONE ENCOUNTER
She is not going to be able to come in before her meds are out she [facility] 65+[facility] please send a refill request to her pharmacy some will be out on the 14th and some on the 18th

## 2022-08-08 NOTE — TELEPHONE ENCOUNTER
Pt called back and she needs all of her meds refilled.  She said that is why is is coming in for a 65+ annual wellness exam.   She just cant get in before they are all due.

## 2022-08-22 RX ORDER — CALCIUM CARBONATE 500 MG/1
1 TABLET, CHEWABLE ORAL 2 TIMES DAILY PRN
Qty: 60 TABLET | Refills: 0 | Status: SHIPPED | OUTPATIENT
Start: 2022-08-22 | End: 2022-08-29

## 2022-08-22 RX ORDER — METOPROLOL SUCCINATE 200 MG/1
200 TABLET, EXTENDED RELEASE ORAL DAILY
Qty: 30 TABLET | Refills: 0 | Status: SHIPPED | OUTPATIENT
Start: 2022-08-22 | End: 2022-08-29

## 2022-08-22 RX ORDER — ATORVASTATIN CALCIUM 40 MG/1
40 TABLET, FILM COATED ORAL DAILY
Qty: 30 TABLET | Refills: 0 | Status: SHIPPED | OUTPATIENT
Start: 2022-08-22 | End: 2022-08-29

## 2022-08-22 RX ORDER — CETIRIZINE HYDROCHLORIDE 10 MG/1
10 TABLET ORAL DAILY
Qty: 30 TABLET | Refills: 0 | Status: SHIPPED | OUTPATIENT
Start: 2022-08-22 | End: 2022-08-29

## 2022-08-22 RX ORDER — LEVETIRACETAM 750 MG/1
750 TABLET ORAL 2 TIMES DAILY
Qty: 60 TABLET | Refills: 0 | Status: SHIPPED | OUTPATIENT
Start: 2022-08-22 | End: 2022-08-29

## 2022-08-22 RX ORDER — AMLODIPINE BESYLATE 10 MG/1
10 TABLET ORAL DAILY
Qty: 30 TABLET | Refills: 0 | Status: SHIPPED | OUTPATIENT
Start: 2022-08-22 | End: 2022-08-29

## 2022-08-22 RX ORDER — FAMOTIDINE 20 MG/1
20 TABLET, FILM COATED ORAL 2 TIMES DAILY
Qty: 60 TABLET | Refills: 0 | Status: SHIPPED | OUTPATIENT
Start: 2022-08-22 | End: 2022-08-29

## 2022-08-22 RX ORDER — LISINOPRIL 40 MG/1
40 TABLET ORAL DAILY
Qty: 30 TABLET | Refills: 0 | Status: SHIPPED | OUTPATIENT
Start: 2022-08-22 | End: 2022-08-29

## 2022-08-29 ENCOUNTER — OFFICE VISIT (OUTPATIENT)
Dept: FAMILY MEDICINE | Facility: CLINIC | Age: 72
End: 2022-08-29
Payer: COMMERCIAL

## 2022-08-29 VITALS
WEIGHT: 157.6 LBS | OXYGEN SATURATION: 98 % | HEIGHT: 60 IN | HEART RATE: 74 BPM | DIASTOLIC BLOOD PRESSURE: 79 MMHG | TEMPERATURE: 98.4 F | SYSTOLIC BLOOD PRESSURE: 144 MMHG | RESPIRATION RATE: 16 BRPM | BODY MASS INDEX: 30.94 KG/M2

## 2022-08-29 DIAGNOSIS — I10 BENIGN ESSENTIAL HYPERTENSION: ICD-10-CM

## 2022-08-29 DIAGNOSIS — J45.20 MILD INTERMITTENT ASTHMA WITHOUT COMPLICATION: Primary | ICD-10-CM

## 2022-08-29 DIAGNOSIS — Z00.00 ENCOUNTER FOR MEDICARE ANNUAL WELLNESS EXAM: ICD-10-CM

## 2022-08-29 DIAGNOSIS — G40.209 PARTIAL SYMPTOMATIC EPILEPSY WITH COMPLEX PARTIAL SEIZURES, NOT INTRACTABLE, WITHOUT STATUS EPILEPTICUS (H): ICD-10-CM

## 2022-08-29 DIAGNOSIS — Z12.11 SCREEN FOR COLON CANCER: ICD-10-CM

## 2022-08-29 DIAGNOSIS — Z11.59 NEED FOR HEPATITIS C SCREENING TEST: ICD-10-CM

## 2022-08-29 DIAGNOSIS — K21.9 GASTROESOPHAGEAL REFLUX DISEASE WITHOUT ESOPHAGITIS: ICD-10-CM

## 2022-08-29 DIAGNOSIS — E78.5 HYPERLIPIDEMIA, UNSPECIFIED HYPERLIPIDEMIA TYPE: ICD-10-CM

## 2022-08-29 DIAGNOSIS — J30.2 SEASONAL ALLERGIC RHINITIS, UNSPECIFIED TRIGGER: ICD-10-CM

## 2022-08-29 DIAGNOSIS — I48.0 PAROXYSMAL ATRIAL FIBRILLATION (H): ICD-10-CM

## 2022-08-29 DIAGNOSIS — Z00.00 MEDICARE ANNUAL WELLNESS VISIT, SUBSEQUENT: ICD-10-CM

## 2022-08-29 LAB
ANION GAP SERPL CALCULATED.3IONS-SCNC: 11 MMOL/L (ref 7–15)
BUN SERPL-MCNC: 24.5 MG/DL (ref 8–23)
CALCIUM SERPL-MCNC: 9.6 MG/DL (ref 8.8–10.2)
CHLORIDE SERPL-SCNC: 107 MMOL/L (ref 98–107)
CHOLEST SERPL-MCNC: 147 MG/DL
CREAT SERPL-MCNC: 0.7 MG/DL (ref 0.51–0.95)
DEPRECATED HCO3 PLAS-SCNC: 23 MMOL/L (ref 22–29)
GFR SERPL CREATININE-BSD FRML MDRD: >90 ML/MIN/1.73M2
GLUCOSE SERPL-MCNC: 104 MG/DL (ref 70–99)
HDLC SERPL-MCNC: 58 MG/DL
LDLC SERPL CALC-MCNC: 49 MG/DL
NONHDLC SERPL-MCNC: 89 MG/DL
POTASSIUM SERPL-SCNC: 4.4 MMOL/L (ref 3.4–5.3)
SODIUM SERPL-SCNC: 141 MMOL/L (ref 136–145)
TRIGL SERPL-MCNC: 202 MG/DL

## 2022-08-29 PROCEDURE — G0439 PPPS, SUBSEQ VISIT: HCPCS | Mod: GC | Performed by: STUDENT IN AN ORGANIZED HEALTH CARE EDUCATION/TRAINING PROGRAM

## 2022-08-29 PROCEDURE — 86803 HEPATITIS C AB TEST: CPT | Performed by: STUDENT IN AN ORGANIZED HEALTH CARE EDUCATION/TRAINING PROGRAM

## 2022-08-29 PROCEDURE — 80061 LIPID PANEL: CPT | Performed by: STUDENT IN AN ORGANIZED HEALTH CARE EDUCATION/TRAINING PROGRAM

## 2022-08-29 PROCEDURE — 80048 BASIC METABOLIC PNL TOTAL CA: CPT | Performed by: STUDENT IN AN ORGANIZED HEALTH CARE EDUCATION/TRAINING PROGRAM

## 2022-08-29 PROCEDURE — G0009 ADMIN PNEUMOCOCCAL VACCINE: HCPCS | Performed by: STUDENT IN AN ORGANIZED HEALTH CARE EDUCATION/TRAINING PROGRAM

## 2022-08-29 PROCEDURE — 36415 COLL VENOUS BLD VENIPUNCTURE: CPT | Performed by: STUDENT IN AN ORGANIZED HEALTH CARE EDUCATION/TRAINING PROGRAM

## 2022-08-29 PROCEDURE — 90677 PCV20 VACCINE IM: CPT | Performed by: STUDENT IN AN ORGANIZED HEALTH CARE EDUCATION/TRAINING PROGRAM

## 2022-08-29 RX ORDER — METOPROLOL SUCCINATE 200 MG/1
200 TABLET, EXTENDED RELEASE ORAL DAILY
Qty: 30 TABLET | Refills: 11 | Status: SHIPPED | OUTPATIENT
Start: 2022-08-29

## 2022-08-29 RX ORDER — FAMOTIDINE 20 MG/1
20 TABLET, FILM COATED ORAL 2 TIMES DAILY
Qty: 60 TABLET | Refills: 11 | Status: SHIPPED | OUTPATIENT
Start: 2022-08-29

## 2022-08-29 RX ORDER — BUDESONIDE AND FORMOTEROL FUMARATE DIHYDRATE 80; 4.5 UG/1; UG/1
AEROSOL RESPIRATORY (INHALATION)
Qty: 20.4 G | Refills: 11 | Status: SHIPPED | OUTPATIENT
Start: 2022-08-29

## 2022-08-29 RX ORDER — ATORVASTATIN CALCIUM 40 MG/1
40 TABLET, FILM COATED ORAL DAILY
Qty: 30 TABLET | Refills: 11 | Status: SHIPPED | OUTPATIENT
Start: 2022-08-29 | End: 2023-08-18 | Stop reason: SINTOL

## 2022-08-29 RX ORDER — CETIRIZINE HYDROCHLORIDE 10 MG/1
10 TABLET ORAL DAILY
Qty: 30 TABLET | Refills: 11 | Status: SHIPPED | OUTPATIENT
Start: 2022-08-29 | End: 2023-09-12 | Stop reason: ALTCHOICE

## 2022-08-29 RX ORDER — ACETAMINOPHEN 325 MG/1
325-650 TABLET ORAL EVERY 6 HOURS PRN
Qty: 60 TABLET | Refills: 11 | Status: SHIPPED | OUTPATIENT
Start: 2022-08-29 | End: 2023-08-18

## 2022-08-29 RX ORDER — LISINOPRIL 40 MG/1
40 TABLET ORAL DAILY
Qty: 30 TABLET | Refills: 11 | Status: SHIPPED | OUTPATIENT
Start: 2022-08-29

## 2022-08-29 RX ORDER — LEVETIRACETAM 750 MG/1
750 TABLET ORAL 2 TIMES DAILY
Qty: 60 TABLET | Refills: 11 | Status: SHIPPED | OUTPATIENT
Start: 2022-08-29

## 2022-08-29 RX ORDER — CALCIUM CARBONATE 500 MG/1
1 TABLET, CHEWABLE ORAL 2 TIMES DAILY PRN
Qty: 60 TABLET | Refills: 11 | Status: SHIPPED | OUTPATIENT
Start: 2022-08-29

## 2022-08-29 RX ORDER — AMLODIPINE BESYLATE 10 MG/1
10 TABLET ORAL DAILY
Qty: 30 TABLET | Refills: 11 | Status: SHIPPED | OUTPATIENT
Start: 2022-08-29 | End: 2023-08-18 | Stop reason: DRUGHIGH

## 2022-08-29 ASSESSMENT — ENCOUNTER SYMPTOMS
DIARRHEA: 1
HEARTBURN: 1
HEADACHES: 0
NAUSEA: 0
MYALGIAS: 0
SHORTNESS OF BREATH: 0
JOINT SWELLING: 1
ARTHRALGIAS: 1
DYSURIA: 0
HEMATURIA: 0
ABDOMINAL PAIN: 0
PALPITATIONS: 0
CONSTIPATION: 1
HEMATOCHEZIA: 0
CHILLS: 0
SORE THROAT: 0
FEVER: 0
PARESTHESIAS: 0
BREAST MASS: 0
FREQUENCY: 1
EYE PAIN: 0
WEAKNESS: 0
NERVOUS/ANXIOUS: 0
DIZZINESS: 0
COUGH: 1

## 2022-08-29 ASSESSMENT — ACTIVITIES OF DAILY LIVING (ADL): CURRENT_FUNCTION: NO ASSISTANCE NEEDED

## 2022-08-29 NOTE — PATIENT INSTRUCTIONS
Patient Education   Personalized Prevention Plan  You are due for the preventive services outlined below.  Your care team is available to assist you in scheduling these services.  If you have already completed any of these items, please share that information with your care team to update in your medical record.  Health Maintenance Due   Topic Date Due     Discuss Advance Care Planning  Never done     Colorectal Cancer Screening  Never done     Hepatitis C Screening  Never done     Pneumococcal Vaccine (2 - PCV) 08/16/2022     Flu Vaccine (1) 09/01/2022       Understanding USDA MyPlate  The USDA has guidelines to help you make healthy food choices. These are called MyPlate. MyPlate shows the food groups that make up healthy meals using the image of a place setting. Before you eat, think about the healthiest choices for what to put on your plate or in your cup or bowl. To learn more about building a healthy plate, visit www.choosemyplate.gov.    The food groups    Fruits. Any fruit or 100% fruit juice counts as part of the Fruit Group. Fruits may be fresh, canned, frozen, or dried, and may be whole, cut-up, or pureed. Make 1/2 of your plate fruits and vegetables.    Vegetables. Any vegetable or 100% vegetable juice counts as a member of the Vegetable Group. Vegetables may be fresh, frozen, canned, or dried. They can be served raw or cooked and may be whole, cut-up, or mashed. Make 1/2 of your plate fruits and vegetables.    Grains. All foods made from grains are part of the Grains Group. These include wheat, rice, oats, cornmeal, and barley. Grains are often used to make foods such as bread, pasta, oatmeal, cereal, tortillas, and grits. Grains should be no more than 1/4 of your plate. At least half of your grains should be whole grains.    Protein. This group includes meat, poultry, seafood, beans and peas, eggs, processed soy products (such as tofu), nuts (including nut butters), and seeds. Make protein choices no  more than 1/4 of your plate. Meat and poultry choices should be lean or low fat.    Dairy. The Dairy Group includes all fluid milk products and foods made from milk that contain calcium, such as yogurt and cheese. (Foods that have little calcium, such as cream, butter, and cream cheese, are not part of this group.) Most dairy choices should be low-fat or fat-free.    Oils. Oils aren't a food group, but they do contain essential nutrients. However it's important to watch your intake of oils. These are fats that are liquid at room temperature. They include canola, corn, olive, soybean, vegetable, and sunflower oil. Foods that are mainly oil include mayonnaise, certain salad dressings, and soft margarines. You likely already get your daily oil allowance from the foods you eat.  Things to limit  Eating healthy also means limiting these things in your diet:       Salt (sodium). Many processed foods have a lot of sodium. To keep sodium intake down, eat fresh vegetables, meats, poultry, and seafood when possible. Purchase low-sodium, reduced-sodium, or no-salt-added food products at the store. And don't add salt to your meals at home. Instead, season them with herbs and spices such as dill, oregano, cumin, and paprika. Or try adding flavor with lemon or lime zest and juice.    Saturated fat. Saturated fats are most often found in animal products such as beef, pork, and chicken. They are often solid at room temperature, such as butter. To reduce your saturated fat intake, choose leaner cuts of meat and poultry. And try healthier cooking methods such as grilling, broiling, roasting, or baking. For a simple lower-fat swap, use plain nonfat yogurt instead of mayonnaise when making potato salad or macaroni salad.    Added sugars. These are sugars added to foods. They are in foods such as ice cream, candy, soda, fruit drinks, sports drinks, energy drinks, cookies, pastries, jams, and syrups. Cut down on added sugars by sharing  sweet treats with a family member or friend. You can also choose fruit for dessert, and drink water or other unsweetened beverages.     [employer] last reviewed this educational content on 6/1/2020 2000-2021 [employer]. All rights reserved. This information is not intended as a substitute for professional medical care. Always follow your healthcare professional's instructions.          Urinary Incontinence, Female (Adult)   Urinary incontinence means loss of bladder control. This problem affects many women, especially as they get older. If you have incontinence, you may be embarrassed to ask for help. But know that this problem can be treated.   Types of Incontinence  There are different types of incontinence. Two of the main types are described here. You can have more than one type.     Stress incontinence. With this type, urine leaks when pressure (stress) is put on the bladder. This may happen when you cough, sneeze, or laugh. Stress incontinence most often occurs because the pelvic floor muscles that support the bladder and urethra are weak. This can happen after pregnancy and vaginal childbirth or a hysterectomy. It can also be due to excess body weight or hormone changes.    Urge incontinence (also called overactive bladder). With this type, a sudden urge to urinate is felt often. This may happen even though there may not be much urine in the bladder. The need to urinate often during the night is common. Urge incontinence most often occurs because of bladder spasms. This may be due to bladder irritation or infection. Damage to bladder nerves or pelvic muscles, constipation, and certain medicines can also lead to urge incontinence.  Treatment depends on the cause. Further evaluation is needed to find the type you have. This will likely include an exam and certain tests. Based on the results, you and your healthcare provider can then plan treatment. Until a diagnosis is made, the home care tips  below can help ease symptoms.   Home care    Do pelvic floor muscle exercises, if they are prescribed. The pelvic floor muscles help support the bladder and urethra. Many women find that their symptoms improve when doing special exercises that strengthen these muscles. To do the exercises, contract the muscles you would use to stop your stream of urine. But do this when you re not urinating. Hold for 10 seconds, then relax. Repeat 10 to 20 times in a row, at least 3 times a day. Your healthcare provider may give you other instructions for how to do the exercises and how often.    Keep a bladder diary. This helps track how often and how much you urinate over a set period of time. Bring this diary with you to your next visit with the provider. The information can help your provider learn more about your bladder problem.    Lose weight, if advised to by your provider. Extra weight puts pressure on the bladder. Your provider can help you create a weight-loss plan that s right for you. This may include exercising more and making certain diet changes.    Don't have foods and drinks that may irritate the bladder. These can include alcohol and caffeinated drinks.    Quit smoking. Smoking and other tobacco use can lead to a long-term (chronic) cough that strains the pelvic floor muscles. Smoking may also damage the bladder and urethra. Talk with your provider about treatments or methods you can use to quit smoking.    If drinking large amounts of fluid makes you have symptoms, you may be advised to limit your fluid intake. You may also be advised to drink most of your fluids during the day and to limit fluids at night.    If you re worried about urine leakage or accidents, you may wear absorbent pads to catch urine. Change the pads often. This helps reduce discomfort. It may also reduce the risk of skin or bladder infections.    Follow-up care  Follow up with your healthcare provider, or as directed. It may take some to find  the right treatment for your problem. But healthy lifestyle changes can be made right away. These include such things as exercising on a regular basis, eating a healthy diet, losing weight (if needed), and quitting smoking. Your treatment plan may include special therapies or medicines. Certain procedures or surgery may also be options. Talk about any questions you have with your provider.   When to seek medical advice  Call the healthcare provider right away if any of these occur:    Fever of 100.4 F (38 C) or higher, or as directed by your provider    Bladder pain or fullness    Belly swelling    Nausea or vomiting    Back pain    Weakness, dizziness, or fainting  Elena last reviewed this educational content on 1/1/2020 2000-2021 [employer]. All rights reserved. This information is not intended as a substitute for professional medical care. Always follow your healthcare professional's instructions.

## 2022-08-29 NOTE — PROGRESS NOTES
Preceptor attestation:  Vital signs reviewed: BP (!) 144/79   Pulse 74   Temp 98.4  F (36.9  C) (Oral)   Resp 16   Ht 1.524 m (5')   Wt 71.5 kg (157 lb 9.6 oz)   SpO2 98%   BMI 30.78 kg/m      Patient seen, evaluated, and discussed with the resident.  I have verified the content of the note, which accurately reflects my assessment of the patient and the plan of care.    Supervising physician: Sandhya Newman MD  [facility]

## 2022-08-29 NOTE — NURSING NOTE
VISION:  vision: Right eye 10/10, Left eye 10/10, with no corrective lens  Both eyes: 10/8    [facility] JHONY Armstrong

## 2022-08-29 NOTE — PROGRESS NOTES
"SUBJECTIVE:   Galina Barnes is a 72 year old female who presents for Preventive Visit.      Patient has been advised of split billing requirements and indicates understanding: Yes  Are you in the first 12 months of your Medicare coverage?  No    Has asthma but hasn't used inhaler for a while.   Hx of seasonal allergies.     Healthy Habits:     In general, how would you rate your overall health?  Good    Frequency of exercise:  2-3 days/week    Duration of exercise:  15-30 minutes    Do you usually eat at least 4 servings of fruit and vegetables a day, include whole grains    & fiber and avoid regularly eating high fat or \"junk\" foods?  No    Taking medications regularly:  Yes    Medication side effects:  Not applicable    Ability to successfully perform activities of daily living:  No assistance needed    Home Safety:  No safety concerns identified    Hearing Impairment:  No hearing concerns    In the past 6 months, have you been bothered by leaking of urine? Yes    In general, how would you rate your overall mental or emotional health?  Good      PHQ-2 Total Score: 1    Additional concerns today:  No    Do you feel safe in your environment? Yes    Have you ever done Advance Care Planning? (For example, a Health Directive, POLST, or a discussion with a medical provider or your loved ones about your wishes): No, advance care planning information given to patient to review.  Advanced care planning was discussed at today's visit.   Pt has unfinished ACP documents at home, agreeable to honoring choices referral     Fall risk  Fallen 2 or more times in the past year?: No  Any fall with injury in the past year?: No  click delete button to remove this line now  Cognitive Screening   1) Repeat 3 items (Leader, Season, Table)    2) Clock draw: NORMAL  3) 3 item recall: Recalls 2 objects   Results: NORMAL clock, 1-2 items recalled: COGNITIVE IMPAIRMENT LESS LIKELY    Mini-CogTM Copyright S Peggy. Licensed by the author for " use in [facility]; reprinted with permission (pierrefermín@[facility]). All rights reserved.      Do you have sleep apnea, excessive snoring or daytime drowsiness?: no    Reviewed and updated as needed this visit by clinical staff   Tobacco  Allergies  Meds   Med Hx  Surg Hx  Fam Hx  Soc Hx          Reviewed and updated as needed this visit by Provider                   Social History     Tobacco Use     Smoking status: Never Smoker     Smokeless tobacco: Never Used   Substance Use Topics     Alcohol use: Not Currently     Alcohol Use 8/29/2022   Prescreen: >3 drinks/day or >7 drinks/week? Not Applicable   No flowsheet data found.      Current providers sharing in care for this patient include:   Patient Care Team:  Andreas Wilson DO as PCP - General (Student in organized health care education/training program)  Jero Saravia MD as MD (Neurology)  Jero Saravia MD as Assigned Neuroscience Provider  Andreas Wilson DO as Assigned PCP    The following health maintenance items are reviewed in Epic and correct as of today:  Health Maintenance Due   Topic Date Due     ADVANCE CARE PLANNING  Never done     COLORECTAL CANCER SCREENING  Never done     HEPATITIS C SCREENING  Never done     MEDICARE ANNUAL WELLNESS VISIT  08/16/2022     Pneumococcal Vaccine: 65+ Years (2 - PCV) 08/16/2022     INFLUENZA VACCINE (1) 09/01/2022     Lab work is in process  Pneumonia Vaccine:For adults 65 years or older who do not have an immunocompromising condition, cerebrospinal fluid leak, or cochlear implant and want to receive PPSV23 ONLY: Administer 1 dose of PPSV23. Anyone who received any doses of PPSV23 before age 65 should receive 1 final dose of the vaccine at age 65 or older. Administer this last dose at least 5 years after the prior PPSV23 dose.    Review of Systems   Constitutional: Negative for chills and fever.   HENT: Positive for congestion. Negative for ear pain, hearing loss and sore throat.    Eyes: Negative for pain and  visual disturbance.   Respiratory: Positive for cough. Negative for shortness of breath.    Cardiovascular: Positive for peripheral edema. Negative for chest pain and palpitations.   Gastrointestinal: Positive for constipation, diarrhea and heartburn. Negative for abdominal pain, hematochezia and nausea.   Breasts:  Negative for tenderness, breast mass and discharge.   Genitourinary: Positive for frequency and urgency. Negative for dysuria, genital sores, hematuria, pelvic pain, vaginal bleeding and vaginal discharge.   Musculoskeletal: Positive for arthralgias and joint swelling. Negative for myalgias.   Skin: Negative for rash.   Neurological: Negative for dizziness, weakness, headaches and paresthesias.   Psychiatric/Behavioral: Positive for mood changes. The patient is not nervous/anxious.      Constitutional, HEENT, cardiovascular, pulmonary, GI, , musculoskeletal, neuro, skin, endocrine and psych systems are negative, except as otherwise noted.    OBJECTIVE:   BP (!) 144/79   Pulse 74   Temp 98.4  F (36.9  C) (Oral)   Resp 16   Ht 1.524 m (5')   Wt 71.5 kg (157 lb 9.6 oz)   SpO2 98%   BMI 30.78 kg/m   Estimated body mass index is 30.78 kg/m  as calculated from the following:    Height as of this encounter: 1.524 m (5').    Weight as of this encounter: 71.5 kg (157 lb 9.6 oz).  Physical Exam    Diagnostic Test Results:  Labs reviewed in Epic    ASSESSMENT / PLAN:   Galina was seen today for physical.    Diagnoses and all orders for this visit:    Mild intermittent asthma without complication  -     budesonide-formoterol (SYMBICORT) 80-4.5 MCG/ACT Inhaler; Inhale 1-2 puffs as needed. May use up to 12 puffs per day.    Screen for colon cancer  -     Fecal colorectal cancer screen FIT; Future  -     Fecal colorectal cancer screen FIT    Need for hepatitis C screening test  -     Hepatitis C Screen Reflex to HCV RNA Quant and Genotype; Future  -     Hepatitis C Screen Reflex to HCV RNA Quant and  Genotype    Medicare annual wellness visit, subsequent  -     Honoring Choices Referral; Future  -     Lipid Profile; Future  -     Basic metabolic panel; Future  -     Basic metabolic panel  -     Lipid Profile    Encounter for Medicare annual wellness exam  -     cetirizine (ZYRTEC) 10 MG tablet; Take 1 tablet (10 mg) by mouth daily  -     acetaminophen (TYLENOL) 325 MG tablet; Take 1-2 tablets (325-650 mg) by mouth every 6 hours as needed for mild pain    Benign essential hypertension  -     amLODIPine (NORVASC) 10 MG tablet; Take 1 tablet (10 mg) by mouth daily  -     lisinopril (ZESTRIL) 40 MG tablet; Take 1 tablet (40 mg) by mouth daily    Seasonal allergic rhinitis, unspecified trigger  -     cetirizine (ZYRTEC) 10 MG tablet; Take 1 tablet (10 mg) by mouth daily    Gastroesophageal reflux disease without esophagitis  -     famotidine (PEPCID) 20 MG tablet; Take 1 tablet (20 mg) by mouth 2 times daily  -     calcium carbonate (TUMS) 500 MG chewable tablet; Take 1 tablet (500 mg) by mouth 2 times daily as needed for heartburn    Paroxysmal atrial fibrillation (H)  -     rivaroxaban ANTICOAGULANT (XARELTO ANTICOAGULANT) 20 MG TABS tablet; TAKE 1 TABLET (20 MG) BY MOUTH DAILY (WITH DINNER)  -     metoprolol succinate ER (TOPROL XL) 200 MG 24 hr tablet; Take 1 tablet (200 mg) by mouth daily  -     lisinopril (ZESTRIL) 40 MG tablet; Take 1 tablet (40 mg) by mouth daily    Hyperlipidemia, unspecified hyperlipidemia type  -     atorvastatin (LIPITOR) 40 MG tablet; Take 1 tablet (40 mg) by mouth daily    Partial symptomatic epilepsy with complex partial seizures, not intractable, without status epilepticus (H)  -     levETIRAcetam (KEPPRA) 750 MG tablet; Take 1 tablet (750 mg) by mouth 2 times daily    Other orders  -     Cancel: Pneumococcal 20 Valent Conjugate (PCV20)  -     Pneumococcal 20 Valent Conjugate (PCV20)        Patient has been advised of split billing requirements and indicates understanding:  Yes    COUNSELING:  Reviewed preventive health counseling, as reflected in patient instructions    Estimated body mass index is 30.78 kg/m  as calculated from the following:    Height as of this encounter: 1.524 m (5').    Weight as of this encounter: 71.5 kg (157 lb 9.6 oz).    Weight management plan: Discussed healthy diet and exercise guidelines    She reports that she has never smoked. She has never used smokeless tobacco.      Appropriate preventive services were discussed with this patient, including applicable screening as appropriate for cardiovascular disease, diabetes, osteopenia/osteoporosis, and glaucoma.  As appropriate for age/gender, discussed screening for colorectal cancer, prostate cancer, breast cancer, and cervical cancer. Checklist reviewing preventive services available has been given to the patient.    Reviewed patients plan of care and provided an AVS. The Basic Care Plan (routine screening as documented in Health Maintenance) for Galina meets the Care Plan requirement. This Care Plan has been established and reviewed with the Patient and caregiver.    Counseling Resources:  ATP IV Guidelines  Pooled Cohorts Equation Calculator  Breast Cancer Risk Calculator  Breast Cancer: Medication to Reduce Risk  FRAX Risk Assessment  ICSI Preventive Guidelines  Dietary Guidelines for Americans, 2010  USDA's MyPlate  ASA Prophylaxis  Lung CA Screening    DO KELSEA Cano [facility]    Identified Health Risks:    The patient was counseled and encouraged to consider modifying their diet and eating habits. She was provided with information on recommended healthy diet options.  Information on urinary incontinence and treatment options given to patient.

## 2022-08-30 LAB — HCV AB SERPL QL IA: NONREACTIVE

## 2022-09-18 ENCOUNTER — HEALTH MAINTENANCE LETTER (OUTPATIENT)
Age: 72
End: 2022-09-18

## 2023-07-25 ENCOUNTER — LAB REQUISITION (OUTPATIENT)
Dept: LAB | Facility: CLINIC | Age: 73
End: 2023-07-25

## 2023-07-25 DIAGNOSIS — Z00.00 ENCOUNTER FOR GENERAL ADULT MEDICAL EXAMINATION WITHOUT ABNORMAL FINDINGS: ICD-10-CM

## 2023-07-25 DIAGNOSIS — I10 ESSENTIAL (PRIMARY) HYPERTENSION: ICD-10-CM

## 2023-07-25 LAB
ALBUMIN SERPL BCG-MCNC: 4.5 G/DL (ref 3.5–5.2)
ALP SERPL-CCNC: 71 U/L (ref 35–104)
ALT SERPL W P-5'-P-CCNC: 8 U/L (ref 0–50)
ANION GAP SERPL CALCULATED.3IONS-SCNC: 11 MMOL/L (ref 7–15)
AST SERPL W P-5'-P-CCNC: 15 U/L (ref 0–45)
BILIRUB SERPL-MCNC: 0.5 MG/DL
BUN SERPL-MCNC: 20.3 MG/DL (ref 8–23)
CALCIUM SERPL-MCNC: 9.2 MG/DL (ref 8.8–10.2)
CHLORIDE SERPL-SCNC: 107 MMOL/L (ref 98–107)
CHOLEST SERPL-MCNC: 124 MG/DL
CREAT SERPL-MCNC: 1.02 MG/DL (ref 0.51–0.95)
DEPRECATED HCO3 PLAS-SCNC: 21 MMOL/L (ref 22–29)
GFR SERPL CREATININE-BSD FRML MDRD: 58 ML/MIN/1.73M2
GLUCOSE SERPL-MCNC: 135 MG/DL (ref 70–99)
HDLC SERPL-MCNC: 45 MG/DL
LDLC SERPL CALC-MCNC: 39 MG/DL
NONHDLC SERPL-MCNC: 79 MG/DL
POTASSIUM SERPL-SCNC: 5.1 MMOL/L (ref 3.4–5.3)
PROT SERPL-MCNC: 6.9 G/DL (ref 6.4–8.3)
SODIUM SERPL-SCNC: 139 MMOL/L (ref 136–145)
TRIGL SERPL-MCNC: 202 MG/DL

## 2023-07-25 PROCEDURE — 80061 LIPID PANEL: CPT | Performed by: FAMILY MEDICINE

## 2023-07-25 PROCEDURE — 80053 COMPREHEN METABOLIC PANEL: CPT | Performed by: FAMILY MEDICINE

## 2023-08-15 ENCOUNTER — OFFICE VISIT (OUTPATIENT)
Dept: PHARMACY | Facility: PHYSICIAN GROUP | Age: 73
End: 2023-08-15
Payer: COMMERCIAL

## 2023-08-15 VITALS — SYSTOLIC BLOOD PRESSURE: 101 MMHG | OXYGEN SATURATION: 100 % | DIASTOLIC BLOOD PRESSURE: 62 MMHG | HEART RATE: 72 BPM

## 2023-08-15 DIAGNOSIS — E78.5 DYSLIPIDEMIA: ICD-10-CM

## 2023-08-15 DIAGNOSIS — J45.909 UNCOMPLICATED ASTHMA, UNSPECIFIED ASTHMA SEVERITY, UNSPECIFIED WHETHER PERSISTENT: ICD-10-CM

## 2023-08-15 DIAGNOSIS — I48.0 PAROXYSMAL ATRIAL FIBRILLATION (H): ICD-10-CM

## 2023-08-15 DIAGNOSIS — I10 BENIGN ESSENTIAL HYPERTENSION: ICD-10-CM

## 2023-08-15 DIAGNOSIS — G40.209 PARTIAL SYMPTOMATIC EPILEPSY WITH COMPLEX PARTIAL SEIZURES, NOT INTRACTABLE, WITHOUT STATUS EPILEPTICUS (H): ICD-10-CM

## 2023-08-15 DIAGNOSIS — Z86.73 HISTORY OF CVA (CEREBROVASCULAR ACCIDENT): ICD-10-CM

## 2023-08-15 DIAGNOSIS — R19.7 DIARRHEA, UNSPECIFIED TYPE: Primary | ICD-10-CM

## 2023-08-15 DIAGNOSIS — Z78.9 TAKES DIETARY SUPPLEMENTS: ICD-10-CM

## 2023-08-15 DIAGNOSIS — K21.9 GASTROESOPHAGEAL REFLUX DISEASE WITHOUT ESOPHAGITIS: ICD-10-CM

## 2023-08-15 PROCEDURE — 99607 MTMS BY PHARM ADDL 15 MIN: CPT | Performed by: PHARMACIST

## 2023-08-15 PROCEDURE — 99605 MTMS BY PHARM NP 15 MIN: CPT | Performed by: PHARMACIST

## 2023-08-15 NOTE — PROGRESS NOTES
Medication Therapy Management (MTM) Encounter    ASSESSMENT:                            Medication Adherence/Access: No issues identified    Diarrhea:    Reviewed medications for likelihood to cause diarrhea. Discussed magnesium supplement has high likelihood but she uses as needed and symptoms do not correlate with when she takes it. Reported incidence with atorvastatin is 14.1%. Given last LDL was 39, it is reasonable to hold this for a month to see if diarrhea improves. If improved could switch to alternative rosuvastatin.   _________________________    Afib:  Stable.  Heart rate is goal of <80 bpm.  [id number] score 5 indicates appropriate anticoagulation.  Patient is on appropriate rate/rhythm control therapy.    _________________________    Hypertension:   Blood pressure is at goal of <140/90 mmHg. Low blood pressure and dizziness has improved with reduced amlodipine dose. Will send lower strength tablet so she doesn't have to split pill.  _________________________    Hyperlipidemia:   LDL is at goal of <70 mg/dL given CVA history. As noted above, atorvastatin may be most likely to contribute to diarrhea. Reasonable to hold dose for a month to see if symptoms improve. If improved would encourage her to try alternative rosuvastatin to reduce risk of secondary CVA.  _________________________    Seizure disorder: Stable.  _________________________    Asthma, Allergies:   She may benefit from moving ceterizine dose to evening to see if that improves symptoms at night and awakening. If not, may consider trial of montelukast.  _________________________    GERD: Stable.   _________________________    Supplements:   Magnesium is likely to cause diarrhea but symptoms do not appear to correlate with as needed occasional use.     PLAN:                            Stop atorvastatin for next month. Keep track of your episodes of diarrhea.   Only take lopiramide (antidiarrhea medicine) if you are leaving the house.  Keep a  log of your blood pressure at home for us to review next visit.   Move when you take Zyrtec to evening instead of morning    Follow-up:   Sep 12, 2023 11:00 AM- Phone Visit  Pharmacist Visit with Essie Benz [facility] ([facility] ) [phone]     SUBJECTIVE/OBJECTIVE:                          Alan Barnes is a 73 year old female coming in for an initial visit. She was referred to me from Dr. García. Patient was accompanied by her daughter.     Reason for visit: Medication review- medication causing diarrhea.    Allergies/ADRs: Reviewed in chart  Past Medical History: Reviewed in chart  Tobacco: She reports that she has never smoked. She has never used smokeless tobacco.  Alcohol: not currently using    Medication Adherence/Access: no issues reported, recently changed primary provider to Dr. García    Diarrhea:    Struggles with diarrhea and wonders if any of her medications could be contributing. She can manage when she is at home but it is bothersome when she goes out to grocery shop or go to appointments. She is fearful of not making it to bathroom on time. She has skipped her medication when she went out to avoid this and doesn't have issue when she skips the medication. She doesn't know which one could contribute. She has been on this combination for a long time. She does take loperamide before going places which does help. She is also doing colegard.     Afib:  Xarelto 20mg daily for stroke prevention  Metoprolol succinate  mg once daily   Patient reports no current medication side effects.   Patient does not have a history of GI bleed.   Patient self-monitors blood pressure.  Home BP monitoring 120s/70s.   She does not follow with cardiology.  [id number] score of 5- age >65, female, hypertension, h/o CVA.    Hypertension:   Amlodipine 5 mg (1/2x 10 mg tablet) once daily- recently reduced  Lisinopril 40 mg once daily  Her  lisinopril dose was decreased by Dr. García last visit due to dizziness. She has noticed less dizziness since dose was lowered. She is cutting 10 mg tablet in half which is hard since she doesn't have pill cutter.  Patient self-monitors blood pressure.  Home BP monitoring 120/72, prior had 94/60, HR typically 70-90.      Hyperlipidemia:   atorvastatin 40 mg daily  Patient reports no significant myalgias or other side effects. and the following possible side effects: diarrhea. She does have a history of small strokes.     Recent Labs   Lab Test 07/25/23  1002 08/29/22  1659   CHOL 124 147   HDL 45* 58   LDL 39 49   TRIG 202* 202*       Seizure disorder:   Levetiracetam 750 mg twice a day   History of seizure in 2019. Follows with Dr. Saravia annually.     Asthma, Allergies:   ICS/LABA - Symbicort 80/4.5 mcg - 1 puff twice a day as needed   Cetrizine 10 mg once daily  Does not use albuterol- doesn't like how it makes her feel. Symbicort works well for her as needed. Symptoms are primarily cough when she has flare up.   Side effects: none.    Patient reports the following symptoms: none, she does feel like allergy pill wears off by night because her eyes itch and she has more nasal drainage when she wakes up.    GERD:   Famotidine 20 mg twice daily   Patient reports no current symptoms.  Patient feels that current regimen is effective.    Supplements:   Magnesium 250 mg as needed   She takes magnesium occasionally for cramping in her neck and it does help. She does not take often and diarrhea she experiences does not seem to correlate with the days she takes it. Diarrhea happens even if she doesn't take it.       Today's Vitals: /62 (BP Location: Right arm, Patient Position: Sitting, Cuff Size: Adult Regular)   Pulse 72   SpO2 100%   ----------------      I spent 45 minutes with this patient today. All changes were made via collaborative practice agreement with Padmini García MD. A copy of the visit note was  provided to the patient's provider(s).    A summary of these recommendations was sent via [employer].    Essie Benz, PharmD, BCACP  Medication Therapy Management Pharmacist  [facility]  Pager: [phone]       Medication Therapy Recommendations  Hyperlipidemia    Current Medication: atorvastatin (LIPITOR) 40 MG tablet (Discontinued)   Rationale: Undesirable effect - Adverse medication event - Safety   Recommendation: Discontinue Medication   Status: Accepted per CPA         Uncomplicated asthma, unspecified asthma severity, unspecified whether persistent    Current Medication: cetirizine (ZYRTEC) 10 MG tablet   Rationale: Condition refractory to medication - Ineffective medication - Effectiveness   Recommendation: Change Administration Time   Status: Accepted - no CPA Needed

## 2023-08-15 NOTE — LETTER
_  Medication List        Prepared on: 08/18/2023     Bring your Medication List when you go to the doctor, hospital, or   emergency room. And, share it with your family or caregivers.     Note any changes to how you take your medications.  Cross out medications when you no longer use them.    Medication How I take it Why I use it Prescriber   acetaminophen (TYLENOL) 500 MG tablet Take 500 mg by mouth every 6 hours as needed for mild pain Pain Patient Reported   amLODIPine (NORVASC) 5 MG tablet Take 5 mg by mouth daily High Blood Pressure  Padmini García MD   budesonide-formoterol (SYMBICORT) 80-4.5 MCG/ACT Inhaler Inhale 1-2 puffs as needed. May use up to 12 puffs per day. Mild intermittent asthma without complication Sandhya Newman MD   calcium carbonate (TUMS) 500 MG chewable tablet Take 1 tablet (500 mg) by mouth 2 times daily as needed for heartburn Gastroesophageal Reflux Disease  Sandhya Newman MD   cetirizine (ZYRTEC) 10 MG tablet Take 1 tablet (10 mg) by mouth daily   Allergies Sandhya Newman MD   famotidine (PEPCID) 20 MG tablet Take 1 tablet (20 mg) by mouth 2 times daily Gastroesophageal Reflux Disease  Sandhya Newman MD   levETIRAcetam (KEPPRA) 750 MG tablet Take 1 tablet (750 mg) by mouth 2 times daily Partial symptomatic epilepsy with complex partial seizures, not intractable, without status epilepticus (H) Sandhya Newman MD   lisinopril (ZESTRIL) 40 MG tablet Take 1 tablet (40 mg) by mouth daily High Blood Pressure  Sandhya Newman MD   loperamide (IMODIUM A-D) 2 MG tablet Take 2 mg by mouth 4 times daily as needed for diarrhea Diarrhea Patient Reported   magnesium 250 MG tablet Take 1 tablet by mouth daily as needed (neck pain, cramping) Neck Pain Patient Reported   metoprolol succinate ER (TOPROL XL) 200 MG 24 hr tablet Take 1 tablet (200 mg) by mouth daily Paroxysmal Atrial Fibrillation (H) Sandhya Newman MD   rivaroxaban ANTICOAGULANT (XARELTO ANTICOAGULANT) 20 MG  TABS tablet TAKE 1 TABLET (20 MG) BY MOUTH DAILY (WITH DINNER) Paroxysmal Atrial Fibrillation (H) Sandhya Newman MD         Add new medications, over-the-counter drugs, herbals, vitamins, or  minerals in the blank rows below.    Medication How I take it Why I use it Prescriber                                      Allergies:      No Known Allergies        Side effects I have had:               Other Information:              My notes and questions:

## 2023-08-15 NOTE — LETTER
August 18, 2023  Galina Barnes  [address]   [place] [address]    Dear Ms. Barnes, [facility]     Thank you for talking with me on Aug 15, 2023 about your health and medications. As a follow-up to our conversation, I have included two documents:      Your Recommended To-Do List has steps you should take to get the best results from your medications.  Your Medication List will help you keep track of your medications and how to take them.    If you want to talk about these documents, please call Essie Benz [facility] at phone: [phone], Monday-Friday 8-4:30pm.    I look forward to working with you and your doctors to make sure your medications work well for you.    Sincerely,  Essie Benz, PharmD  [facility] Pharmacist, [facility]

## 2023-08-15 NOTE — LETTER
"Recommended To-Do List      Prepared on: 08/18/2023       You can get the best results from your medications by completing the items on this \"To-Do List.\"      Bring your To-Do List when you go to your doctor. And, share it with your family or caregivers.    My To-Do List:  What we talked about: What I should do:   An issue with your medication    Stop taking atorvastatin (LIPITOR)          What we talked about: What I should do:   A medication that is not working    Change when you are taking cetirizine (zyrTEC) to evening          What we talked about: What I should do:                     "

## 2023-08-18 RX ORDER — LOPERAMIDE HYDROCHLORIDE 2 MG/1
2 TABLET ORAL 4 TIMES DAILY PRN
COMMUNITY

## 2023-08-18 RX ORDER — ACETAMINOPHEN 500 MG
500 TABLET ORAL EVERY 6 HOURS PRN
COMMUNITY

## 2023-08-18 RX ORDER — MULTIVITAMIN WITH IRON
1 TABLET ORAL DAILY PRN
COMMUNITY

## 2023-08-18 RX ORDER — AMLODIPINE BESYLATE 5 MG/1
5 TABLET ORAL DAILY
COMMUNITY

## 2023-08-18 NOTE — PATIENT INSTRUCTIONS
"Recommendations from today's MTM visit:                                                    MTM (medication therapy management) is a service provided by a clinical pharmacist designed to help you get the most of out of your medicines.   Today we reviewed what your medicines are for, how to know if they are working, that your medicines are safe and how to make your medicine regimen as easy as possible.      Stop atorvastatin for next month. Keep track of your episodes of diarrhea.   Only take lopiramide (antidiarrhea medicine) if you are leaving the house.  Keep a log of your blood pressure at home for us to review next visit.   Move when you take Zyrtec to evening instead of morning     Follow-up:   Sep 12, 2023 11:00 AM- Phone Visit  Pharmacist Visit with Essie Benz RPH  [facility] ([facility] )     It was great speaking with you today.  I value your experience and would be very thankful for your time in providing feedback in our clinic survey. In the next few days, you may receive an email or text message from [employer] with a link to a survey related to your  clinical pharmacist.\"     My Clinical Pharmacist's contact information:                                                      Please feel free to contact me with any questions or concerns you have.      Essie Benz, PharmD, BCACP  Medication Therapy Management Pharmacist  [facility]  Pager: [phone]         "

## 2023-09-07 ENCOUNTER — ANCILLARY PROCEDURE (OUTPATIENT)
Dept: MAMMOGRAPHY | Facility: HOSPITAL | Age: 73
End: 2023-09-07
Attending: FAMILY MEDICINE
Payer: COMMERCIAL

## 2023-09-07 DIAGNOSIS — Z12.31 VISIT FOR SCREENING MAMMOGRAM: ICD-10-CM

## 2023-09-07 PROCEDURE — 77067 SCR MAMMO BI INCL CAD: CPT

## 2023-09-12 ENCOUNTER — VIRTUAL VISIT (OUTPATIENT)
Dept: PHARMACY | Facility: PHYSICIAN GROUP | Age: 73
End: 2023-09-12
Payer: COMMERCIAL

## 2023-09-12 DIAGNOSIS — I10 BENIGN ESSENTIAL HYPERTENSION: ICD-10-CM

## 2023-09-12 DIAGNOSIS — J45.909 UNCOMPLICATED ASTHMA, UNSPECIFIED ASTHMA SEVERITY, UNSPECIFIED WHETHER PERSISTENT: ICD-10-CM

## 2023-09-12 DIAGNOSIS — K21.9 GASTROESOPHAGEAL REFLUX DISEASE WITHOUT ESOPHAGITIS: ICD-10-CM

## 2023-09-12 DIAGNOSIS — E78.5 DYSLIPIDEMIA: ICD-10-CM

## 2023-09-12 DIAGNOSIS — R19.7 DIARRHEA, UNSPECIFIED TYPE: Primary | ICD-10-CM

## 2023-09-12 PROCEDURE — 99606 MTMS BY PHARM EST 15 MIN: CPT | Performed by: PHARMACIST

## 2023-09-12 PROCEDURE — 99607 MTMS BY PHARM ADDL 15 MIN: CPT | Performed by: PHARMACIST

## 2023-09-12 RX ORDER — LEVOCETIRIZINE DIHYDROCHLORIDE 5 MG/1
5 TABLET, FILM COATED ORAL EVERY EVENING
COMMUNITY
Start: 2023-09-12

## 2023-09-12 RX ORDER — ATORVASTATIN CALCIUM 40 MG/1
40 TABLET, FILM COATED ORAL DAILY
COMMUNITY

## 2023-09-12 NOTE — PATIENT INSTRUCTIONS
"Recommendations from today's MTM visit:                                                       Restart atorvastatin 40 mg daily.    Okay to take loperamide two 2 mg tablets every morning and up to four tablets a day as needed. Try to do Coleguard test when you are able.     Try decreasing famotidine to once a day at night to see if this make diarrhea any better. You can take Tums as needed during the day.     Try switching your allergy pill to over-the-counter levocetirizine (Xyzal) 5 mg daily to see if that works better. Sometimes switching after you have been taking the same one for a long time helps.     Follow-up: appointment with Dr. García on 10/2/2023 at 2:00 pm. MTM follow-up with me in 6-12 months or sooner if needed.     It was great speaking with you today.  I value your experience and would be very thankful for your time in providing feedback in our clinic survey. In the next few days, you may receive an email or text message from [employer] with a link to a survey related to your  clinical pharmacist.\"     To schedule another MTM appointment, please call [phone].    My Clinical Pharmacist's contact information:                                                      Please feel free to contact me with any questions or concerns you have.      Essie Benz, PharmD, BCACP  Medication Therapy Management Pharmacist  [facility]  Voicemail: [phone]         "

## 2023-09-12 NOTE — PROGRESS NOTES
Medication Therapy Management (MTM) Encounter    ASSESSMENT:                            Medication Adherence/Access:   No issues identified    Diarrhea:    No improvement with holding atorvastatin. Reasonable to resume use of loperamide daily to allow for formed stool to collect for Cologuard. Discussed that famotidine has been reported to induce drug-related colitis but the overall reported incidence of diarrhea are low. She would like to try reducing dose to see if that makes a difference. Magnesium is used as needed occasionally and does not appear to correlate with diarrhea.     Hypertension:   Blood pressure is at goal of <140/90 mmHg. Low blood pressure and dizziness has improved with reduced amlodipine dose. Continues to have rare occasional low home blood pressure readings and encouraged her to stay hydrated given ongoing diarrhea issues.     Hyperlipidemia:   LDL is at goal of <70 mg/dL given CVA history. She should restart atorvastatin.     Asthma, Allergies:   She may benefit from switching cetirizine to levocetirizine to see if that helps her symptoms since she has been on current agent for a long time. Montelukast could also be an option but she would prefer to use over-the-counter agent versus a prescription.     GERD:   Famotidine has been associated with microscopic colitis. Overall incidence of diarrhea reported are low. PPIs have also been linked to this so would not be a good alternative. She would like to try reducing dose to see if this helps. Appropriate to continue use of Tums as needed.     PLAN:                            Restart atorvastatin 40 mg daily.    Okay to take loperamide two 2 mg tablets every morning and up to four tablets a day as needed. Try to do Coleguard test when you are able.     Try decreasing famotidine to once a day at night to see if this make diarrhea any better. You can take Tums as needed during the day.     Try switching your allergy pill to over-the-counter  levocetirizine (Xyzal) 5 mg daily to see if that works better. Sometimes switching after you have been taking the same one for a long time helps.     Follow-up: appointment with Dr. García on 10/2/2023 at 2:00 pm. MTM follow-up with me in 6-12 months or sooner if needed.     SUBJECTIVE/OBJECTIVE:                          Alan Barnes is a 73 year old female called for a follow-up visit from 8/15/2023.       Reason for visit: Medication side effect follow-up.    Allergies/ADRs: Reviewed in chart  Past Medical History: Reviewed in chart  Tobacco: She reports that she has never smoked. She has never used smokeless tobacco.  Alcohol: not currently using    Medication Adherence/Access:   no issues reported    Diarrhea:   Loperamide 2 mg four times a day as needed-   Still been having diarrhea. She stopped taking loperamide regularly while stopping atorvastatin but has struggled this month. Frequency of diarrhea episodes is 1-2 times a day to up to 7 times a day.   Anti-diarrhea pills helps when she takes so she can leave her home. She would like to restart this daily.   Not able to do Cologuard yet due to having diarrhea and needs to have formed stool for test.  She is drinking a lot of water to help stay hydrated.   She tried using fiber at direction of previous provider but this didn't help diarrhea.   Tries to make note of any foods that trigger her symptoms.     Hyperlipidemia   no current medications  Stopped atorvastatin after our last visit to see if that improved diarrhea. No improvement since stopping.   She has history of CVA so will require statin therapy.      Recent Labs   Lab Test 07/25/23  1002 08/29/22  1659   CHOL 124 147   HDL 45* 58   LDL 39 49   TRIG 202* 202*     Hypertension:   Amlodipine 5 mg (once daily  Lisinopril 40 mg once daily  Metoprolol succinate  mg once daily   Dizziness and lightheadedness has improved since amlodipine dose was lowered.   Patient self-monitors blood pressure.    Home  Blood Pressures: 124/68, 125/62, she had an 83/45 reading     Asthma, Allergies:   ICS/LABA - Symbicort 80/4.5 mcg - 1 puff twice a day as needed   Cetrizine 10 mg once daily  Does not use albuterol- doesn't like how it makes her feel. Symbicort works well for her as needed. Symptoms are primarily cough when she has flare up.   Side effects: none.    She did move cetirize to night and didn't help night time symptoms.     GERD:   Famotidine 20 mg twice daily   Tums 500 mg as needed   Patient reports no current symptoms.  Patient feels that current regimen is effective.     Today's Vitals: There were no vitals taken for this visit.  ----------------      I spent 43 minutes with this patient today. All changes were made via collaborative practice agreement with Padmini García MD. A copy of the visit note was provided to the patient's provider(s).    A summary of these recommendations was sent via [employer].    Essie Benz, PharmD, BCACP  Medication Therapy Management Pharmacist  [facility]  Pager: [phone]      Telemedicine Visit Details  Type of service:  Telephone visit  Start Time:  11:01 AM  End Time: 11:44 AM       Medication Therapy Recommendations  Gastroesophageal reflux disease without esophagitis    Current Medication: famotidine (PEPCID) 20 MG tablet   Rationale: Undesirable effect - Adverse medication event - Safety   Recommendation: Decrease Frequency   Status: Accepted per CPA         Uncomplicated asthma, unspecified asthma severity, unspecified whether persistent    Current Medication: cetirizine (ZYRTEC) 10 MG tablet (Discontinued)   Rationale: Condition refractory to medication - Ineffective medication - Effectiveness   Recommendation: Change Medication - levocetirizine 5 MG tablet   Status: Accepted - no CPA Needed

## 2023-09-29 DIAGNOSIS — K21.9 GASTROESOPHAGEAL REFLUX DISEASE WITHOUT ESOPHAGITIS: ICD-10-CM

## 2023-09-29 DIAGNOSIS — I48.0 PAROXYSMAL ATRIAL FIBRILLATION (H): ICD-10-CM

## 2023-10-08 ENCOUNTER — HEALTH MAINTENANCE LETTER (OUTPATIENT)
Age: 73
End: 2023-10-08

## 2023-11-01 ENCOUNTER — LAB REQUISITION (OUTPATIENT)
Dept: LAB | Facility: CLINIC | Age: 73
End: 2023-11-01

## 2023-11-01 DIAGNOSIS — I48.91 UNSPECIFIED ATRIAL FIBRILLATION (H): ICD-10-CM

## 2023-11-01 LAB
ANION GAP SERPL CALCULATED.3IONS-SCNC: 13 MMOL/L (ref 7–15)
BUN SERPL-MCNC: 16.7 MG/DL (ref 8–23)
CALCIUM SERPL-MCNC: 9.5 MG/DL (ref 8.8–10.2)
CHLORIDE SERPL-SCNC: 108 MMOL/L (ref 98–107)
CREAT SERPL-MCNC: 0.81 MG/DL (ref 0.51–0.95)
DEPRECATED HCO3 PLAS-SCNC: 20 MMOL/L (ref 22–29)
EGFRCR SERPLBLD CKD-EPI 2021: 76 ML/MIN/1.73M2
GLUCOSE SERPL-MCNC: 141 MG/DL (ref 70–99)
POTASSIUM SERPL-SCNC: 3.8 MMOL/L (ref 3.4–5.3)
SODIUM SERPL-SCNC: 141 MMOL/L (ref 135–145)

## 2023-11-01 PROCEDURE — 82310 ASSAY OF CALCIUM: CPT | Performed by: FAMILY MEDICINE

## 2023-12-28 RX ORDER — METOPROLOL SUCCINATE 200 MG/1
200 TABLET, EXTENDED RELEASE ORAL DAILY
Qty: 90 TABLET | Refills: 0 | OUTPATIENT
Start: 2023-12-28

## 2023-12-28 RX ORDER — RIVAROXABAN 20 MG/1
20 TABLET, FILM COATED ORAL
Qty: 90 TABLET | Refills: 0 | OUTPATIENT
Start: 2023-12-28

## 2023-12-28 RX ORDER — FAMOTIDINE 20 MG/1
20 TABLET, FILM COATED ORAL 2 TIMES DAILY
Qty: 180 TABLET | Refills: 0 | OUTPATIENT
Start: 2023-12-28

## 2024-05-17 ENCOUNTER — LAB REQUISITION (OUTPATIENT)
Dept: LAB | Facility: CLINIC | Age: 74
End: 2024-05-17

## 2024-05-17 DIAGNOSIS — N18.31 CHRONIC KIDNEY DISEASE, STAGE 3A (H): ICD-10-CM

## 2024-05-17 DIAGNOSIS — R73.09 OTHER ABNORMAL GLUCOSE: ICD-10-CM

## 2024-05-17 PROCEDURE — 80048 BASIC METABOLIC PNL TOTAL CA: CPT | Performed by: FAMILY MEDICINE

## 2024-05-17 PROCEDURE — 80061 LIPID PANEL: CPT | Performed by: FAMILY MEDICINE

## 2024-05-18 LAB
ANION GAP SERPL CALCULATED.3IONS-SCNC: 15 MMOL/L (ref 7–15)
BUN SERPL-MCNC: 19.4 MG/DL (ref 8–23)
CALCIUM SERPL-MCNC: 9.2 MG/DL (ref 8.8–10.2)
CHLORIDE SERPL-SCNC: 107 MMOL/L (ref 98–107)
CHOLEST SERPL-MCNC: 159 MG/DL
CREAT SERPL-MCNC: 0.92 MG/DL (ref 0.51–0.95)
DEPRECATED HCO3 PLAS-SCNC: 16 MMOL/L (ref 22–29)
EGFRCR SERPLBLD CKD-EPI 2021: 65 ML/MIN/1.73M2
FASTING STATUS PATIENT QL REPORTED: ABNORMAL
FASTING STATUS PATIENT QL REPORTED: ABNORMAL
GLUCOSE SERPL-MCNC: 147 MG/DL (ref 70–99)
HDLC SERPL-MCNC: 43 MG/DL
LDLC SERPL CALC-MCNC: 78 MG/DL
NONHDLC SERPL-MCNC: 116 MG/DL
POTASSIUM SERPL-SCNC: 4.4 MMOL/L (ref 3.4–5.3)
SODIUM SERPL-SCNC: 138 MMOL/L (ref 135–145)
TRIGL SERPL-MCNC: 189 MG/DL

## 2024-10-08 ENCOUNTER — HOSPITAL ENCOUNTER (EMERGENCY)
Facility: HOSPITAL | Age: 74
Discharge: HOME | End: 2024-10-08
Attending: EMERGENCY MEDICINE | Admitting: EMERGENCY MEDICINE
Payer: COMMERCIAL

## 2024-10-08 VITALS
RESPIRATION RATE: 17 BRPM | BODY MASS INDEX: 29.17 KG/M2 | HEART RATE: 71 BPM | DIASTOLIC BLOOD PRESSURE: 64 MMHG | SYSTOLIC BLOOD PRESSURE: 134 MMHG | TEMPERATURE: 98.5 F | WEIGHT: 148.6 LBS | OXYGEN SATURATION: 100 % | HEIGHT: 60 IN

## 2024-10-08 DIAGNOSIS — D64.9 ANEMIA, UNSPECIFIED TYPE: ICD-10-CM

## 2024-10-08 LAB
ABO/RH(D): NORMAL
ALBUMIN SERPL BCG-MCNC: 4 G/DL (ref 3.5–5.2)
ALP SERPL-CCNC: 58 U/L (ref 40–150)
ALT SERPL W P-5'-P-CCNC: 7 U/L (ref 0–50)
ANION GAP SERPL CALCULATED.3IONS-SCNC: 11 MMOL/L (ref 7–15)
ANTIBODY SCREEN: NEGATIVE
AST SERPL W P-5'-P-CCNC: 10 U/L (ref 0–45)
BILIRUB DIRECT SERPL-MCNC: <0.2 MG/DL (ref 0–0.3)
BILIRUB SERPL-MCNC: 0.5 MG/DL
BLD PROD TYP BPU: NORMAL
BLD PROD TYP BPU: NORMAL
BLOOD COMPONENT TYPE: NORMAL
BLOOD COMPONENT TYPE: NORMAL
BUN SERPL-MCNC: 9.8 MG/DL (ref 8–23)
CALCIUM SERPL-MCNC: 8.7 MG/DL (ref 8.8–10.4)
CHLORIDE SERPL-SCNC: 108 MMOL/L (ref 98–107)
CODING SYSTEM: NORMAL
CODING SYSTEM: NORMAL
CREAT SERPL-MCNC: 0.64 MG/DL (ref 0.51–0.95)
CROSSMATCH: NORMAL
CROSSMATCH: NORMAL
EGFRCR SERPLBLD CKD-EPI 2021: >90 ML/MIN/1.73M2
ERYTHROCYTE [DISTWIDTH] IN BLOOD BY AUTOMATED COUNT: 17.7 % (ref 10–15)
GLUCOSE SERPL-MCNC: 108 MG/DL (ref 70–99)
HCO3 SERPL-SCNC: 20 MMOL/L (ref 22–29)
HCT VFR BLD AUTO: 21.5 % (ref 35–47)
HGB BLD-MCNC: 5.4 G/DL (ref 11.7–15.7)
HOLD SPECIMEN: NORMAL
HOLD SPECIMEN: NORMAL
ISSUE DATE AND TIME: NORMAL
ISSUE DATE AND TIME: NORMAL
MCH RBC QN AUTO: 18.3 PG (ref 26.5–33)
MCHC RBC AUTO-ENTMCNC: 25.1 G/DL (ref 31.5–36.5)
MCV RBC AUTO: 73 FL (ref 78–100)
PLATELET # BLD AUTO: 204 10E3/UL (ref 150–450)
POTASSIUM SERPL-SCNC: 3.7 MMOL/L (ref 3.4–5.3)
PROT SERPL-MCNC: 6.5 G/DL (ref 6.4–8.3)
RBC # BLD AUTO: 2.95 10E6/UL (ref 3.8–5.2)
SODIUM SERPL-SCNC: 139 MMOL/L (ref 135–145)
SPECIMEN EXPIRATION DATE: NORMAL
UNIT ABO/RH: NORMAL
UNIT ABO/RH: NORMAL
UNIT NUMBER: NORMAL
UNIT NUMBER: NORMAL
UNIT STATUS: NORMAL
UNIT STATUS: NORMAL
UNIT TYPE ISBT: 5100
UNIT TYPE ISBT: 5100
WBC # BLD AUTO: 9.3 10E3/UL (ref 4–11)

## 2024-10-08 PROCEDURE — 86923 COMPATIBILITY TEST ELECTRIC: CPT | Performed by: EMERGENCY MEDICINE

## 2024-10-08 PROCEDURE — 82435 ASSAY OF BLOOD CHLORIDE: CPT | Performed by: EMERGENCY MEDICINE

## 2024-10-08 PROCEDURE — 250N000013 HC RX MED GY IP 250 OP 250 PS 637: Performed by: EMERGENCY MEDICINE

## 2024-10-08 PROCEDURE — 86900 BLOOD TYPING SEROLOGIC ABO: CPT | Performed by: EMERGENCY MEDICINE

## 2024-10-08 PROCEDURE — 82947 ASSAY GLUCOSE BLOOD QUANT: CPT | Performed by: EMERGENCY MEDICINE

## 2024-10-08 PROCEDURE — 86901 BLOOD TYPING SEROLOGIC RH(D): CPT | Performed by: EMERGENCY MEDICINE

## 2024-10-08 PROCEDURE — P9016 RBC LEUKOCYTES REDUCED: HCPCS | Performed by: EMERGENCY MEDICINE

## 2024-10-08 PROCEDURE — 36415 COLL VENOUS BLD VENIPUNCTURE: CPT | Performed by: EMERGENCY MEDICINE

## 2024-10-08 PROCEDURE — 84155 ASSAY OF PROTEIN SERUM: CPT | Performed by: EMERGENCY MEDICINE

## 2024-10-08 PROCEDURE — 36430 TRANSFUSION BLD/BLD COMPNT: CPT

## 2024-10-08 PROCEDURE — 85018 HEMOGLOBIN: CPT | Performed by: EMERGENCY MEDICINE

## 2024-10-08 PROCEDURE — 82248 BILIRUBIN DIRECT: CPT | Performed by: EMERGENCY MEDICINE

## 2024-10-08 PROCEDURE — 99285 EMERGENCY DEPT VISIT HI MDM: CPT | Mod: 25

## 2024-10-08 RX ORDER — ACETAMINOPHEN 325 MG/1
650 TABLET ORAL ONCE
Status: COMPLETED | OUTPATIENT
Start: 2024-10-08 | End: 2024-10-08

## 2024-10-08 RX ADMIN — ACETAMINOPHEN 650 MG: 325 TABLET ORAL at 16:23

## 2024-10-08 ASSESSMENT — ACTIVITIES OF DAILY LIVING (ADL)
ADLS_ACUITY_SCORE: 33
ADLS_ACUITY_SCORE: 37

## 2024-10-08 ASSESSMENT — COLUMBIA-SUICIDE SEVERITY RATING SCALE - C-SSRS
6. HAVE YOU EVER DONE ANYTHING, STARTED TO DO ANYTHING, OR PREPARED TO DO ANYTHING TO END YOUR LIFE?: NO
2. HAVE YOU ACTUALLY HAD ANY THOUGHTS OF KILLING YOURSELF IN THE PAST MONTH?: NO
1. IN THE PAST MONTH, HAVE YOU WISHED YOU WERE DEAD OR WISHED YOU COULD GO TO SLEEP AND NOT WAKE UP?: YES

## 2024-10-08 NOTE — DISCHARGE INSTRUCTIONS
Your blood count was found to be quite low today.  You received a blood transfusion.  Follow-up closely with your primary care doctor and return to the emergency department for any worsening symptoms or other concerns.

## 2024-10-08 NOTE — ED PROVIDER NOTES
EMERGENCY DEPARTMENT ENCOUnter      NAME: Galina Barnes  AGE: 74 year old female  YOB: 1950  MRN: [id number]  EVALUATION DATE & TIME: 10/8/2024 12:59 PM    PCP: Padmini García    ED PROVIDER: Hong Orosco DO      Chief Complaint   Patient presents with    Abnormal Labs         FINAL IMPRESSION:  1. Anemia, unspecified type          ED COURSE & MEDICAL DECISION MAKIN:10 PM I met with the patient for an initial encounter and evaluation.       The patient presented to the emergency department today after being found to have an abnormal laboratory test on an outpatient order.  She was found to be quite anemic.  She admits to some mild fatigue but no recent changes or other symptoms.  Laboratory testing confirms a hemoglobin of 5.6 today.  Blood transfusion has been ordered.  The patient does not want to stay in the hospital.  Given her otherwise well appearance I feel she can be safely discharged home following the transfusion.  She is comfortable with this plan.      Medical Decision Making  Obtained supplemental history:Supplemental history obtained?: Documented in chart and Family Member/Significant Other  Reviewed external records: External records reviewed?: Documented in chart  Care impacted by chronic illness:Heart Disease, Hyperlipidemia, Hypertension, and Other: GERD, Chronic Pain, Atrial Fibrillation  Did you consider but not order tests?: In addition to work-up documented, I considered the following work up:   Did you interpret images independently?: Independent interpretation of ECG and images noted in documentation, when applicable.  Consultation discussion with other provider:Did you involve another provider (consultant, , pharmacy, etc.)?: No  Discharge. No recommendations on prescription strength medication(s). See documentation for any additional details.    MIPS: Not Applicable      At the conclusion of the encounter I discussed the results of all of the tests and the  "disposition. The questions were answered. The patient or family acknowledged understanding and was agreeable with the care plan.         MEDICATIONS GIVEN IN THE EMERGENCY:  Medications   acetaminophen (TYLENOL) tablet 650 mg (650 mg Oral $Given 10/8/24 5285)       =================================================================    HPI    Galina Barnes is a 74 year old female with a pertinent history of hypertension, hyperlipidemia, heart disease, atrial fibrillation, GERD, chronic osteoarthritis, and epilepsy, who presents to this ED by private car for evaluation of low hemoglobin level around 5. She received a call from her provider about this and was referred to the ED. Patient has \"constant\" diarrhea with abdominal \"cramps\" afterwards. She denies bleeding/loss or tarry stools.    Per the patient's daughter, the patient was \"yellow\" appearing yesterday. Otherwise, normal today. No other medical concerns are expressed at this time.     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  no recurrance since    Tension headache     TIA (transient ischemic attack) 04/2019    [facility]ospiltalized at [facility]    Uncomplicated asthma        PAST SURGICAL HISTORY:  Past Surgical History:   Procedure Laterality Date    HYSTERECTOMY N/A 08/27/1975    TUBAL LIGATION      ZC TOTAL KNEE ARTHROPLASTY Right 12/4/2019    Procedure: RIGHT TOTAL KNEE ARTHROPLASTY;  Surgeon: Shahram Aguilar MD;  Location: [facility] OR;  Service: Orthopedics           CURRENT " MEDICATIONS:    acetaminophen (TYLENOL) 500 MG tablet  amLODIPine (NORVASC) 5 MG tablet  atorvastatin (LIPITOR) 40 MG tablet  budesonide-formoterol (SYMBICORT) 80-4.5 MCG/ACT Inhaler  calcium carbonate (TUMS) 500 MG chewable tablet  famotidine (PEPCID) 20 MG tablet  levETIRAcetam (KEPPRA) 750 MG tablet  levocetirizine (XYZAL) 5 MG tablet  lisinopril (ZESTRIL) 40 MG tablet  loperamide (IMODIUM A-D) 2 MG tablet  magnesium 250 MG tablet  metoprolol succinate ER (TOPROL XL) 200 MG 24 hr tablet  rivaroxaban ANTICOAGULANT (XARELTO ANTICOAGULANT) 20 MG TABS tablet        ALLERGIES:  No Known Allergies    FAMILY HISTORY:  Family History   Problem Relation Age of Onset    Diabetes Other     Cancer No family hx of     Diabetes Maternal Grandfather     Alcoholism Other        SOCIAL HISTORY:   Social History     Socioeconomic History    Marital status:      Spouse name: None    Number of children: None    Years of education: None    Highest education level: None   Tobacco Use    Smoking status: Never    Smokeless tobacco: Never   Substance and Sexual Activity    Alcohol use: Not Currently    Drug use: Never       VITALS:  No data found.      PHYSICAL EXAM    Constitutional:  Well developed, Well nourished, pale appearing.  HENT:  Normocephalic, Atraumatic, Oropharynx moist, Nose normal.   Eyes:  EOMI, Conjunctiva normal, No discharge.   Respiratory:  Normal breath sounds, No respiratory distress, No wheezing, No chest tenderness.   Cardiovascular:  Normal heart rate, Normal rhythm, No murmurs  GI:  Soft, No tenderness, No guarding, No CVA tenderness.   Musculoskeletal:  No tenderness to palpation or major deformities noted.   Extremities: No lower extremity edema.  Neurologic:  Alert & oriented x 3, No focal deficits noted.   Psychiatric:  Affect normal, Judgment normal, Mood normal.        LAB:  All pertinent labs reviewed and interpreted.  Results for orders placed or performed during the hospital encounter of  10/08/24   CBC with platelets   Result Value Ref Range    WBC Count 9.3 4.0 - 11.0 10e3/uL    RBC Count 2.95 (L) 3.80 - 5.20 10e6/uL    Hemoglobin 5.4 (LL) 11.7 - 15.7 g/dL    Hematocrit 21.5 (L) 35.0 - 47.0 %    MCV 73 (L) 78 - 100 fL    MCH 18.3 (L) 26.5 - 33.0 pg    MCHC 25.1 (L) 31.5 - 36.5 g/dL    RDW 17.7 (H) 10.0 - 15.0 %    Platelet Count 204 150 - 450 10e3/uL   Basic metabolic panel   Result Value Ref Range    Sodium 139 135 - 145 mmol/L    Potassium 3.7 3.4 - 5.3 mmol/L    Chloride 108 (H) 98 - 107 mmol/L    Carbon Dioxide (CO2) 20 (L) 22 - 29 mmol/L    Anion Gap 11 7 - 15 mmol/L    Urea Nitrogen 9.8 8.0 - 23.0 mg/dL    Creatinine 0.64 0.51 - 0.95 mg/dL    GFR Estimate >90 >60 mL/min/1.73m2    Calcium 8.7 (L) 8.8 - 10.4 mg/dL    Glucose 108 (H) 70 - 99 mg/dL   Hepatic function panel   Result Value Ref Range    Protein Total 6.5 6.4 - 8.3 g/dL    Albumin 4.0 3.5 - 5.2 g/dL    Bilirubin Total 0.5 <=1.2 mg/dL    Alkaline Phosphatase 58 40 - 150 U/L    AST 10 0 - 45 U/L    ALT 7 0 - 50 U/L    Bilirubin Direct <0.20 0.00 - 0.30 mg/dL         I, [address]ato [address], am serving as a scribe to document services personally performed by Dr. Orosco based on my observation and the provider's statements to me. IHong DO attest that [address] is acting in a scribe capacity, has observed my performance of the services and has documented them in accordance with my direction.    Hong Orosco DO  Emergency Medicine  [facility] EMERGENCY DEPARTMENT  [address] [place] [id number] [phone]  Dept: [phone]     Hong Orosco DO  10/10/24 0630

## 2024-10-08 NOTE — ED TRIAGE NOTES
Pt went to [facility] yesterday for chronic diarrhea.  Did a blood draw.  Pt was called today to go to ED for blood transfusion.  Pt is pale.     Triage Assessment (Adult)       Row Name 10/08/24 1254          Triage Assessment    Airway WDL WDL        Respiratory WDL    Respiratory WDL WDL

## 2024-10-11 ENCOUNTER — LAB REQUISITION (OUTPATIENT)
Dept: LAB | Facility: CLINIC | Age: 74
End: 2024-10-11

## 2024-10-11 DIAGNOSIS — D64.9 ANEMIA, UNSPECIFIED: ICD-10-CM

## 2024-10-11 LAB
FERRITIN SERPL-MCNC: 9 NG/ML (ref 11–328)
IRON BINDING CAPACITY (ROCHE): 406 UG/DL (ref 240–430)
IRON SATN MFR SERPL: 3 % (ref 15–46)
IRON SERPL-MCNC: 13 UG/DL (ref 37–145)
IRON SERPL-MCNC: 13 UG/DL (ref 37–145)
VIT B12 SERPL-MCNC: 432 PG/ML (ref 232–1245)

## 2024-10-11 PROCEDURE — 82607 VITAMIN B-12: CPT | Performed by: FAMILY MEDICINE

## 2024-10-11 PROCEDURE — 83550 IRON BINDING TEST: CPT | Performed by: FAMILY MEDICINE

## 2024-10-11 PROCEDURE — 83516 IMMUNOASSAY NONANTIBODY: CPT | Performed by: FAMILY MEDICINE

## 2024-10-11 PROCEDURE — 82728 ASSAY OF FERRITIN: CPT | Performed by: FAMILY MEDICINE

## 2024-10-11 PROCEDURE — 83540 ASSAY OF IRON: CPT | Performed by: FAMILY MEDICINE

## 2024-10-11 PROCEDURE — 82784 ASSAY IGA/IGD/IGG/IGM EACH: CPT | Performed by: FAMILY MEDICINE

## 2024-10-14 LAB
GLIADIN IGA SER-ACNC: 0.7 U/ML
GLIADIN IGG SER-ACNC: 1.1 U/ML
IGA SERPL-MCNC: 183 MG/DL (ref 84–499)
TTG IGA SER-ACNC: 0.3 U/ML
TTG IGG SER-ACNC: <0.6 U/ML

## 2024-12-01 ENCOUNTER — HEALTH MAINTENANCE LETTER (OUTPATIENT)
Age: 74
End: 2024-12-01

## 2025-01-31 ENCOUNTER — LAB REQUISITION (OUTPATIENT)
Dept: LAB | Facility: CLINIC | Age: 75
End: 2025-01-31
Payer: COMMERCIAL

## 2025-01-31 DIAGNOSIS — D64.9 ANEMIA, UNSPECIFIED: ICD-10-CM

## 2025-01-31 LAB — IRON SERPL-MCNC: 35 UG/DL (ref 37–145)

## 2025-01-31 PROCEDURE — 83540 ASSAY OF IRON: CPT | Mod: ORL | Performed by: FAMILY MEDICINE

## 2025-03-15 ENCOUNTER — HEALTH MAINTENANCE LETTER (OUTPATIENT)
Age: 75
End: 2025-03-15

## 2025-08-01 ENCOUNTER — LAB REQUISITION (OUTPATIENT)
Dept: LAB | Facility: CLINIC | Age: 75
End: 2025-08-01
Payer: COMMERCIAL

## 2025-08-01 DIAGNOSIS — I12.9 HYPERTENSIVE CHRONIC KIDNEY DISEASE WITH STAGE 1 THROUGH STAGE 4 CHRONIC KIDNEY DISEASE, OR UNSPECIFIED CHRONIC KIDNEY DISEASE: ICD-10-CM

## 2025-08-01 DIAGNOSIS — G40.909 EPILEPSY, UNSPECIFIED, NOT INTRACTABLE, WITHOUT STATUS EPILEPTICUS (H): ICD-10-CM

## 2025-08-01 DIAGNOSIS — D50.9 IRON DEFICIENCY ANEMIA, UNSPECIFIED: ICD-10-CM

## 2025-08-01 PROCEDURE — 80177 DRUG SCRN QUAN LEVETIRACETAM: CPT | Mod: ORL | Performed by: FAMILY MEDICINE

## 2025-08-01 PROCEDURE — 82728 ASSAY OF FERRITIN: CPT | Mod: ORL | Performed by: FAMILY MEDICINE

## 2025-08-01 PROCEDURE — 80048 BASIC METABOLIC PNL TOTAL CA: CPT | Mod: ORL | Performed by: FAMILY MEDICINE

## 2025-08-02 LAB
ANION GAP SERPL CALCULATED.3IONS-SCNC: 14 MMOL/L (ref 7–15)
BUN SERPL-MCNC: 22.7 MG/DL (ref 8–23)
CALCIUM SERPL-MCNC: 9.3 MG/DL (ref 8.8–10.4)
CHLORIDE SERPL-SCNC: 106 MMOL/L (ref 98–107)
CREAT SERPL-MCNC: 0.89 MG/DL (ref 0.51–0.95)
EGFRCR SERPLBLD CKD-EPI 2021: 68 ML/MIN/1.73M2
FERRITIN SERPL-MCNC: 17 NG/ML (ref 11–328)
GLUCOSE SERPL-MCNC: 97 MG/DL (ref 70–99)
HCO3 SERPL-SCNC: 16 MMOL/L (ref 22–29)
LEVETIRACETAM SERPL-MCNC: 46.4 ÂΜG/ML (ref 10–40)
POTASSIUM SERPL-SCNC: 4.1 MMOL/L (ref 3.4–5.3)
SODIUM SERPL-SCNC: 136 MMOL/L (ref 135–145)